# Patient Record
Sex: MALE | Race: WHITE | NOT HISPANIC OR LATINO | Employment: OTHER | URBAN - METROPOLITAN AREA
[De-identification: names, ages, dates, MRNs, and addresses within clinical notes are randomized per-mention and may not be internally consistent; named-entity substitution may affect disease eponyms.]

---

## 2017-04-11 ENCOUNTER — HOSPITAL ENCOUNTER (OUTPATIENT)
Dept: FAMILY MEDICINE CLINIC | Facility: CLINIC | Age: 47
Setting detail: SPECIMEN
Discharge: HOME | End: 2017-04-11
Attending: FAMILY MEDICINE | Admitting: FAMILY MEDICINE

## 2017-04-11 LAB
ALBUMIN SERPL-MCNC: 3.6 G/DL (ref 3.5–4.8)
ALBUMIN/GLOB SERPL: 1.5 {RATIO} (ref 1–1.7)
ALP SERPL-CCNC: 65 IU/L (ref 32–91)
ALT SERPL-CCNC: 30 IU/L (ref 17–63)
ANION GAP SERPL CALC-SCNC: 11.4 MMOL/L (ref 10–20)
AST SERPL-CCNC: 23 IU/L (ref 15–41)
BASOPHILS # BLD AUTO: 0 10*3/UL (ref 0–0.2)
BASOPHILS NFR BLD AUTO: 1 % (ref 0–2)
BILIRUB SERPL-MCNC: 0.7 MG/DL (ref 0.3–1.2)
BUN SERPL-MCNC: 17 MG/DL (ref 8–20)
BUN/CREAT SERPL: 14.2 (ref 6.2–20.3)
CALCIUM SERPL-MCNC: 9.4 MG/DL (ref 8.9–10.3)
CHLORIDE SERPL-SCNC: 106 MMOL/L (ref 101–111)
CHOLEST SERPL-MCNC: 161 MG/DL
CHOLEST/HDLC SERPL: 4.5 {RATIO}
CONV CO2: 28 MMOL/L (ref 22–32)
CONV LDL CHOLESTEROL DIRECT: 112 MG/DL (ref 0–100)
CONV TOTAL PROTEIN: 6 G/DL (ref 6.1–7.9)
CREAT UR-MCNC: 1.2 MG/DL (ref 0.7–1.2)
DIFFERENTIAL METHOD BLD: (no result)
EOSINOPHIL # BLD AUTO: 0.1 10*3/UL (ref 0–0.3)
EOSINOPHIL # BLD AUTO: 2 % (ref 0–3)
ERYTHROCYTE [DISTWIDTH] IN BLOOD BY AUTOMATED COUNT: 13.1 % (ref 11.5–14.5)
GLOBULIN UR ELPH-MCNC: 2.4 G/DL (ref 2.5–3.8)
GLUCOSE SERPL-MCNC: 96 MG/DL (ref 65–99)
HCT VFR BLD AUTO: 42.9 % (ref 40–54)
HDLC SERPL-MCNC: 36 MG/DL
HGB BLD-MCNC: 15 G/DL (ref 14–18)
LDLC/HDLC SERPL: 3.1 {RATIO}
LIPID INTERPRETATION: ABNORMAL
LYMPHOCYTES # BLD AUTO: 1.6 10*3/UL (ref 0.8–4.8)
LYMPHOCYTES NFR BLD AUTO: 29 % (ref 18–42)
MCH RBC QN AUTO: 31 PG (ref 26–32)
MCHC RBC AUTO-ENTMCNC: 35 G/DL (ref 32–36)
MCV RBC AUTO: 88.7 FL (ref 80–94)
MONOCYTES # BLD AUTO: 0.6 10*3/UL (ref 0.1–1.3)
MONOCYTES NFR BLD AUTO: 10 % (ref 2–11)
NEUTROPHILS # BLD AUTO: 3.2 10*3/UL (ref 2.3–8.6)
NEUTROPHILS NFR BLD AUTO: 58 % (ref 50–75)
NRBC BLD AUTO-RTO: 0 /100{WBCS}
NRBC/RBC NFR BLD MANUAL: 0 10*3/UL
PLATELET # BLD AUTO: 227 10*3/UL (ref 150–450)
PMV BLD AUTO: 7.8 FL (ref 7.4–10.4)
POTASSIUM SERPL-SCNC: 4.4 MMOL/L (ref 3.6–5.1)
PSA SERPL-MCNC: 1.07 NG/ML (ref 0–4)
RBC # BLD AUTO: 4.83 10*6/UL (ref 4.6–6)
SODIUM SERPL-SCNC: 141 MMOL/L (ref 136–144)
TRIGL SERPL-MCNC: 90 MG/DL
TSH SERPL-ACNC: 1.63 UIU/ML (ref 0.34–5.6)
VLDLC SERPL CALC-MCNC: 13.7 MG/DL
WBC # BLD AUTO: 5.5 10*3/UL (ref 4.5–11.5)

## 2017-06-22 ENCOUNTER — HOSPITAL ENCOUNTER (OUTPATIENT)
Dept: URGENT CARE | Facility: CLINIC | Age: 47
Discharge: HOME | End: 2017-06-22
Attending: FAMILY MEDICINE | Admitting: FAMILY MEDICINE

## 2018-07-23 ENCOUNTER — HOSPITAL ENCOUNTER (OUTPATIENT)
Dept: FAMILY MEDICINE CLINIC | Facility: CLINIC | Age: 48
Setting detail: SPECIMEN
Discharge: HOME | End: 2018-07-23
Attending: FAMILY MEDICINE | Admitting: FAMILY MEDICINE

## 2018-07-23 LAB
ALBUMIN SERPL-MCNC: 4.1 G/DL (ref 3.5–4.8)
ALBUMIN/GLOB SERPL: 1.7 {RATIO} (ref 1–1.7)
ALP SERPL-CCNC: 79 IU/L (ref 32–91)
ALT SERPL-CCNC: 38 IU/L (ref 17–63)
ANION GAP SERPL CALC-SCNC: 10.8 MMOL/L (ref 10–20)
AST SERPL-CCNC: 29 IU/L (ref 15–41)
BASOPHILS # BLD AUTO: 0 10*3/UL (ref 0–0.2)
BASOPHILS NFR BLD AUTO: 0 % (ref 0–2)
BILIRUB SERPL-MCNC: 0.8 MG/DL (ref 0.3–1.2)
BUN SERPL-MCNC: 12 MG/DL (ref 8–20)
BUN/CREAT SERPL: 9.2 (ref 6.2–20.3)
CALCIUM SERPL-MCNC: 9.4 MG/DL (ref 8.9–10.3)
CHLORIDE SERPL-SCNC: 102 MMOL/L (ref 101–111)
CONV CO2: 29 MMOL/L (ref 22–32)
CONV TOTAL PROTEIN: 6.5 G/DL (ref 6.1–7.9)
CREAT UR-MCNC: 1.3 MG/DL (ref 0.7–1.2)
DIFFERENTIAL METHOD BLD: (no result)
EOSINOPHIL # BLD AUTO: 0.1 10*3/UL (ref 0–0.3)
EOSINOPHIL # BLD AUTO: 1 % (ref 0–3)
ERYTHROCYTE [DISTWIDTH] IN BLOOD BY AUTOMATED COUNT: 13.5 % (ref 11.5–14.5)
GLOBULIN UR ELPH-MCNC: 2.4 G/DL (ref 2.5–3.8)
GLUCOSE SERPL-MCNC: 79 MG/DL (ref 65–99)
HCT VFR BLD AUTO: 45.4 % (ref 40–54)
HGB BLD-MCNC: 15.9 G/DL (ref 14–18)
LYMPHOCYTES # BLD AUTO: 1.5 10*3/UL (ref 0.8–4.8)
LYMPHOCYTES NFR BLD AUTO: 17 % (ref 18–42)
MCH RBC QN AUTO: 31 PG (ref 26–32)
MCHC RBC AUTO-ENTMCNC: 35.1 G/DL (ref 32–36)
MCV RBC AUTO: 88.2 FL (ref 80–94)
MONOCYTES # BLD AUTO: 0.5 10*3/UL (ref 0.1–1.3)
MONOCYTES NFR BLD AUTO: 6 % (ref 2–11)
NEUTROPHILS # BLD AUTO: 6.6 10*3/UL (ref 2.3–8.6)
NEUTROPHILS NFR BLD AUTO: 76 % (ref 50–75)
NRBC BLD AUTO-RTO: 0 /100{WBCS}
NRBC/RBC NFR BLD MANUAL: 0 10*3/UL
PLATELET # BLD AUTO: 279 10*3/UL (ref 150–450)
PMV BLD AUTO: 7.6 FL (ref 7.4–10.4)
POTASSIUM SERPL-SCNC: 3.8 MMOL/L (ref 3.6–5.1)
RBC # BLD AUTO: 5.14 10*6/UL (ref 4.6–6)
SODIUM SERPL-SCNC: 138 MMOL/L (ref 136–144)
WBC # BLD AUTO: 8.8 10*3/UL (ref 4.5–11.5)

## 2018-07-24 LAB
CONV HIV-1/ HIV-2: NORMAL
CONV HIV-1/ HIV-2: NORMAL
HBV CORE IGM SERPL QL IA: NONREACTIVE
HBV SURFACE AG SERPL QL IA: NONREACTIVE
HCV AB SER DONR QL: NORMAL
HCV AB SER DONR QL: NORMAL

## 2018-10-17 ENCOUNTER — HOSPITAL ENCOUNTER (OUTPATIENT)
Dept: FAMILY MEDICINE CLINIC | Facility: CLINIC | Age: 48
Setting detail: SPECIMEN
Discharge: HOME | End: 2018-10-17
Attending: FAMILY MEDICINE | Admitting: FAMILY MEDICINE

## 2018-10-17 LAB — PSA SERPL-MCNC: 1.67 NG/ML (ref 0–4)

## 2019-07-24 RX ORDER — ATORVASTATIN CALCIUM 20 MG/1
TABLET, FILM COATED ORAL
Qty: 90 TABLET | Refills: 1 | Status: SHIPPED | OUTPATIENT
Start: 2019-07-24 | End: 2020-01-25

## 2019-08-13 RX ORDER — BACLOFEN 10 MG/1
TABLET ORAL
Qty: 60 TABLET | Refills: 1 | Status: SHIPPED | OUTPATIENT
Start: 2019-08-13 | End: 2020-04-01

## 2020-01-25 RX ORDER — ATORVASTATIN CALCIUM 20 MG/1
TABLET, FILM COATED ORAL
Qty: 90 TABLET | Refills: 1 | Status: SHIPPED | OUTPATIENT
Start: 2020-01-25 | End: 2020-06-19

## 2020-02-06 ENCOUNTER — RESULTS ENCOUNTER (OUTPATIENT)
Dept: FAMILY MEDICINE CLINIC | Facility: CLINIC | Age: 50
End: 2020-02-06

## 2020-02-06 ENCOUNTER — OFFICE VISIT (OUTPATIENT)
Dept: FAMILY MEDICINE CLINIC | Facility: CLINIC | Age: 50
End: 2020-02-06

## 2020-02-06 VITALS
TEMPERATURE: 98.3 F | HEART RATE: 65 BPM | SYSTOLIC BLOOD PRESSURE: 123 MMHG | OXYGEN SATURATION: 98 % | WEIGHT: 230 LBS | DIASTOLIC BLOOD PRESSURE: 85 MMHG | BODY MASS INDEX: 33 KG/M2

## 2020-02-06 DIAGNOSIS — M25.552 ACUTE HIP PAIN, LEFT: ICD-10-CM

## 2020-02-06 DIAGNOSIS — Z29.9 PREVENTIVE MEASURE: ICD-10-CM

## 2020-02-06 DIAGNOSIS — Z12.5 ENCOUNTER FOR SCREENING FOR MALIGNANT NEOPLASM OF PROSTATE: ICD-10-CM

## 2020-02-06 DIAGNOSIS — E78.00 HIGH CHOLESTEROL: Primary | ICD-10-CM

## 2020-02-06 DIAGNOSIS — M79.642 LEFT HAND PAIN: ICD-10-CM

## 2020-02-06 LAB
ALBUMIN SERPL-MCNC: 4.4 G/DL (ref 3.5–5.2)
ALBUMIN/GLOB SERPL: 1.8 G/DL
ALP SERPL-CCNC: 86 U/L (ref 39–117)
ALT SERPL W P-5'-P-CCNC: 31 U/L (ref 1–41)
ANION GAP SERPL CALCULATED.3IONS-SCNC: 11.5 MMOL/L (ref 5–15)
AST SERPL-CCNC: 23 U/L (ref 1–40)
BASOPHILS # BLD AUTO: 0.05 10*3/MM3 (ref 0–0.2)
BASOPHILS NFR BLD AUTO: 0.9 % (ref 0–1.5)
BILIRUB SERPL-MCNC: 0.6 MG/DL (ref 0.2–1.2)
BUN BLD-MCNC: 16 MG/DL (ref 6–20)
BUN/CREAT SERPL: 14.3 (ref 7–25)
CALCIUM SPEC-SCNC: 9.7 MG/DL (ref 8.6–10.5)
CHLORIDE SERPL-SCNC: 101 MMOL/L (ref 98–107)
CHOLEST SERPL-MCNC: 186 MG/DL (ref 0–200)
CO2 SERPL-SCNC: 28.5 MMOL/L (ref 22–29)
CREAT BLD-MCNC: 1.12 MG/DL (ref 0.76–1.27)
DEPRECATED RDW RBC AUTO: 39.4 FL (ref 37–54)
EOSINOPHIL # BLD AUTO: 0.13 10*3/MM3 (ref 0–0.4)
EOSINOPHIL NFR BLD AUTO: 2.4 % (ref 0.3–6.2)
ERYTHROCYTE [DISTWIDTH] IN BLOOD BY AUTOMATED COUNT: 12.4 % (ref 12.3–15.4)
GFR SERPL CREATININE-BSD FRML MDRD: 69 ML/MIN/1.73
GLOBULIN UR ELPH-MCNC: 2.4 GM/DL
GLUCOSE BLD-MCNC: 109 MG/DL (ref 65–99)
HCT VFR BLD AUTO: 47.8 % (ref 37.5–51)
HDLC SERPL-MCNC: 42 MG/DL (ref 40–60)
HGB BLD-MCNC: 16.6 G/DL (ref 13–17.7)
IMM GRANULOCYTES # BLD AUTO: 0.01 10*3/MM3 (ref 0–0.05)
IMM GRANULOCYTES NFR BLD AUTO: 0.2 % (ref 0–0.5)
LDLC SERPL CALC-MCNC: 128 MG/DL (ref 0–100)
LDLC/HDLC SERPL: 3.04 {RATIO}
LYMPHOCYTES # BLD AUTO: 1.21 10*3/MM3 (ref 0.7–3.1)
LYMPHOCYTES NFR BLD AUTO: 22.5 % (ref 19.6–45.3)
MCH RBC QN AUTO: 30.3 PG (ref 26.6–33)
MCHC RBC AUTO-ENTMCNC: 34.7 G/DL (ref 31.5–35.7)
MCV RBC AUTO: 87.2 FL (ref 79–97)
MONOCYTES # BLD AUTO: 0.52 10*3/MM3 (ref 0.1–0.9)
MONOCYTES NFR BLD AUTO: 9.7 % (ref 5–12)
NEUTROPHILS # BLD AUTO: 3.46 10*3/MM3 (ref 1.7–7)
NEUTROPHILS NFR BLD AUTO: 64.3 % (ref 42.7–76)
NRBC BLD AUTO-RTO: 0 /100 WBC (ref 0–0.2)
PLATELET # BLD AUTO: 277 10*3/MM3 (ref 140–450)
PMV BLD AUTO: 9.7 FL (ref 6–12)
POTASSIUM BLD-SCNC: 4.8 MMOL/L (ref 3.5–5.2)
PROT SERPL-MCNC: 6.8 G/DL (ref 6–8.5)
PSA SERPL-MCNC: 1.56 NG/ML (ref 0–4)
RBC # BLD AUTO: 5.48 10*6/MM3 (ref 4.14–5.8)
SODIUM BLD-SCNC: 141 MMOL/L (ref 136–145)
TRIGL SERPL-MCNC: 81 MG/DL (ref 0–150)
URATE SERPL-MCNC: 6.1 MG/DL (ref 3.4–7)
VLDLC SERPL-MCNC: 16.2 MG/DL (ref 5–40)
WBC NRBC COR # BLD: 5.38 10*3/MM3 (ref 3.4–10.8)

## 2020-02-06 PROCEDURE — 99214 OFFICE O/P EST MOD 30 MIN: CPT | Performed by: FAMILY MEDICINE

## 2020-02-06 PROCEDURE — 80061 LIPID PANEL: CPT | Performed by: FAMILY MEDICINE

## 2020-02-06 PROCEDURE — 85025 COMPLETE CBC W/AUTO DIFF WBC: CPT | Performed by: FAMILY MEDICINE

## 2020-02-06 PROCEDURE — 80053 COMPREHEN METABOLIC PANEL: CPT | Performed by: FAMILY MEDICINE

## 2020-02-06 PROCEDURE — 84550 ASSAY OF BLOOD/URIC ACID: CPT | Performed by: FAMILY MEDICINE

## 2020-02-06 PROCEDURE — G0103 PSA SCREENING: HCPCS | Performed by: FAMILY MEDICINE

## 2020-02-06 PROCEDURE — 36415 COLL VENOUS BLD VENIPUNCTURE: CPT | Performed by: FAMILY MEDICINE

## 2020-02-06 NOTE — PROGRESS NOTES
Subjective   Ari Ngo is a 49 y.o. male.     He is in for follow up on his high cholesterol. He has noted some pain in his hips, primarily with running. He also has some stiffness and swelling in the 3rd finger of the L hand with no history of trauma or injury.  He denies any numbness or tingling in the hand or leg.  He has been able to do his ADLs, however has been much more uncomfortable, particularly the hip issue with putting on pants and socks.  He denies any issues with bowel or bladder function.  He denies any chest pain or headaches.  He denies any issues with sleep or mood.         /85 (BP Location: Right arm, Patient Position: Sitting, Cuff Size: Adult)   Pulse 65   Temp 98.3 °F (36.8 °C) (Oral)   Wt 104 kg (230 lb)   SpO2 98%   BMI 33.00 kg/m²       Chief Complaint   Patient presents with   • Med Refill   • Hand Pain     swelling in his middle finger on his left hand   • Hip Pain     mostly his left but sometimes the right one also           Current Outpatient Medications:   •  atorvastatin (LIPITOR) 20 MG tablet, TAKE 1 TABLET BY MOUTH AT BEDTIME, Disp: 90 tablet, Rfl: 1  •  baclofen (LIORESAL) 10 MG tablet, TAKE 1-2 TABLETS BY MOUTH 3 TIMES A DAY AS NEEDED FOR BACK PAIN/SPASMS, Disp: 60 tablet, Rfl: 1        The following portions of the patient's history were reviewed and updated as appropriate: allergies, current medications, past family history, past medical history, past social history, past surgical history and problem list.    Review of Systems   Constitutional: Negative for activity change, fatigue and fever.   HENT: Negative for congestion, sinus pressure, sinus pain, sore throat and trouble swallowing.    Eyes: Negative for visual disturbance.   Respiratory: Negative for chest tightness, shortness of breath and wheezing.    Cardiovascular: Negative for chest pain.   Gastrointestinal: Negative for abdominal distention, abdominal pain, constipation, diarrhea, nausea and vomiting.    Genitourinary: Negative for difficulty urinating, dysuria, frequency and urgency.   Musculoskeletal: Positive for arthralgias. Negative for back pain, gait problem and neck pain.   Neurological: Negative for dizziness, weakness, light-headedness, numbness and headaches.   Psychiatric/Behavioral: Negative for agitation, hallucinations and suicidal ideas.       Objective   Physical Exam   Constitutional: He is oriented to person, place, and time. No distress.   Neck: Normal range of motion. No thyromegaly present.   Cardiovascular: Normal rate, regular rhythm and normal heart sounds.   Pulmonary/Chest: Effort normal and breath sounds normal.   Abdominal: Soft. Bowel sounds are normal.   Musculoskeletal:   Limited range of motion in the left hip, pain with internal rotation  Swollen PIP of the third finger left hand but he can make a fist without pain and has normal strength in the hand   Lymphadenopathy:     He has no cervical adenopathy.   Neurological: He is alert and oriented to person, place, and time. He displays normal reflexes.   Skin: No rash noted.   Nursing note and vitals reviewed.        Assessment/Plan   Problems Addressed this Visit     None      Visit Diagnoses     High cholesterol    -  Primary    Relevant Orders    Comprehensive metabolic panel    Lipid panel    Preventive measure        Relevant Orders    Cologuard - Stool, Per Rectum    Left hand pain        Relevant Orders    XR Hand 3+ View Left    Uric acid    Acute hip pain, left        Relevant Orders    XR Hip With or Without Pelvis 4 View Left    CBC w AUTO Differential (Completed)    CBC Auto Differential (Completed)    Encounter for screening for malignant neoplasm of prostate        Relevant Orders    PSA SCREENING          I will update appropriate labs  I will refer out an order to [facility] as he is now 49 and needs colon cancer screening  I will send for x-rays of the hand and hip given current complaints  I will see him back or  refer as indicated by findings     '

## 2020-02-16 DIAGNOSIS — M25.552 ACUTE HIP PAIN, LEFT: Primary | ICD-10-CM

## 2020-04-01 RX ORDER — BACLOFEN 10 MG/1
TABLET ORAL
Qty: 60 TABLET | Refills: 1 | Status: SHIPPED | OUTPATIENT
Start: 2020-04-01 | End: 2020-11-24 | Stop reason: SDUPTHER

## 2020-04-06 ENCOUNTER — TELEPHONE (OUTPATIENT)
Dept: FAMILY MEDICINE CLINIC | Facility: CLINIC | Age: 50
End: 2020-04-06

## 2020-04-06 NOTE — TELEPHONE ENCOUNTER
Unfortunately right now there is not  They are working on a test to see if antibodies are present, similar to what we do to see if someone needs a booster for MMR or shingle vaccines  I think will have that and soon but we do not have it now

## 2020-05-26 ENCOUNTER — OFFICE VISIT (OUTPATIENT)
Dept: FAMILY MEDICINE CLINIC | Facility: CLINIC | Age: 50
End: 2020-05-26

## 2020-05-26 VITALS
DIASTOLIC BLOOD PRESSURE: 83 MMHG | HEART RATE: 67 BPM | BODY MASS INDEX: 34.01 KG/M2 | TEMPERATURE: 98.7 F | WEIGHT: 237 LBS | SYSTOLIC BLOOD PRESSURE: 120 MMHG | OXYGEN SATURATION: 97 %

## 2020-05-26 DIAGNOSIS — M15.9 PRIMARY OSTEOARTHRITIS INVOLVING MULTIPLE JOINTS: Primary | ICD-10-CM

## 2020-05-26 PROCEDURE — 99213 OFFICE O/P EST LOW 20 MIN: CPT | Performed by: FAMILY MEDICINE

## 2020-05-26 RX ORDER — NABUMETONE 750 MG/1
750 TABLET, FILM COATED ORAL 2 TIMES DAILY
Qty: 60 TABLET | Refills: 2 | Status: SHIPPED | OUTPATIENT
Start: 2020-05-26 | End: 2020-12-22

## 2020-05-26 NOTE — PROGRESS NOTES
Subjective   Ari Ngo is a 49 y.o. male.     He is in today with some lingering discomfort in his hands and his feet.  He has had one episode of pain in his mid back when he lifted an item that caused some spasm and nearly dropped him to his knees.  He has not had any further episode of problem with his back but his knees and and feet and hands continue to be daily sources of discomfort.  He has not had any swelling of note.  He does not take any medication for this.  He has tried some of his baclofen for this without any help.  Sleep is okay.  He has been working on better diet and has reduced his soda intake but has gained some weight because he has not been as active of late.  Exercise has been uncomfortable.         /83 (BP Location: Right arm, Patient Position: Sitting, Cuff Size: Adult)   Pulse 67   Temp 98.7 °F (37.1 °C) (Temporal)   Wt 108 kg (237 lb)   SpO2 97%   BMI 34.01 kg/m²       Chief Complaint   Patient presents with   • Hand Pain     had an expisode picked up trays and went to turn and had a pain in his mid back that drove him to his knees   • Generalized Body Aches     pain in feet (sometimes) wrist pain           Current Outpatient Medications:   •  atorvastatin (LIPITOR) 20 MG tablet, TAKE 1 TABLET BY MOUTH AT BEDTIME, Disp: 90 tablet, Rfl: 1  •  baclofen (LIORESAL) 10 MG tablet, TAKE 1-2 TABLETS BY MOUTH 3 TIMES A DAY AS NEEDED FOR BACK PAIN/SPASMS, Disp: 60 tablet, Rfl: 1  •  nabumetone (RELAFEN) 750 MG tablet, Take 1 tablet by mouth 2 (Two) Times a Day., Disp: 60 tablet, Rfl: 2        The following portions of the patient's history were reviewed and updated as appropriate: allergies, current medications, past family history, past medical history, past social history, past surgical history and problem list.    Review of Systems   Constitutional: Negative for activity change, fatigue and fever.   HENT: Negative for congestion, sinus pressure, sinus pain, sore throat and trouble  swallowing.    Eyes: Negative for visual disturbance.   Respiratory: Negative for chest tightness, shortness of breath and wheezing.    Cardiovascular: Negative for chest pain.   Gastrointestinal: Negative for abdominal distention, abdominal pain, constipation, diarrhea, nausea and vomiting.   Genitourinary: Negative for difficulty urinating and dysuria.   Musculoskeletal: Positive for arthralgias. Negative for back pain and neck pain.   Psychiatric/Behavioral: Negative for agitation, hallucinations and suicidal ideas.       Objective   Physical Exam   Constitutional: He is oriented to person, place, and time.   HENT:   Mouth/Throat: No oropharyngeal exudate.   Neck: Normal range of motion. No thyromegaly present.   Cardiovascular: Normal rate, regular rhythm and normal heart sounds.   Pulmonary/Chest: Effort normal and breath sounds normal. He has no wheezes.   Abdominal: Soft. Bowel sounds are normal. There is no guarding.   Musculoskeletal: He exhibits no edema.   Diffusely arthritic in the hands and feet and knees  Back also with arthritic issues in the mid back   Lymphadenopathy:     He has no cervical adenopathy.   Neurological: He is alert and oriented to person, place, and time. He displays normal reflexes.   Psychiatric: He has a normal mood and affect.   Nursing note and vitals reviewed.        Assessment/Plan   Problems Addressed this Visit     None      Visit Diagnoses     Primary osteoarthritis involving multiple joints    -  Primary        I will have him use nabumetone on a daily basis for the next couple of weeks and update me on response  He is to call with any new concerns in the interim  We will reconsider physical therapy which I referred him for but he decided not to do because of coronavirus, should we feel it would be helpful.

## 2020-06-19 RX ORDER — ATORVASTATIN CALCIUM 20 MG/1
TABLET, FILM COATED ORAL
Qty: 90 TABLET | Refills: 1 | Status: SHIPPED | OUTPATIENT
Start: 2020-06-19 | End: 2020-12-23

## 2020-11-24 RX ORDER — BACLOFEN 10 MG/1
10 TABLET ORAL 3 TIMES DAILY
Qty: 60 TABLET | Refills: 1 | Status: SHIPPED | OUTPATIENT
Start: 2020-11-24 | End: 2021-08-03

## 2020-12-22 RX ORDER — NABUMETONE 750 MG/1
TABLET, FILM COATED ORAL
Qty: 60 TABLET | Refills: 2 | Status: SHIPPED | OUTPATIENT
Start: 2020-12-22 | End: 2021-07-23

## 2020-12-23 RX ORDER — ATORVASTATIN CALCIUM 20 MG/1
TABLET, FILM COATED ORAL
Qty: 90 TABLET | Refills: 1 | Status: SHIPPED | OUTPATIENT
Start: 2020-12-23 | End: 2021-07-31

## 2021-04-22 ENCOUNTER — LAB (OUTPATIENT)
Dept: FAMILY MEDICINE CLINIC | Facility: CLINIC | Age: 51
End: 2021-04-22

## 2021-04-22 ENCOUNTER — OFFICE VISIT (OUTPATIENT)
Dept: FAMILY MEDICINE CLINIC | Facility: CLINIC | Age: 51
End: 2021-04-22

## 2021-04-22 VITALS
WEIGHT: 237 LBS | DIASTOLIC BLOOD PRESSURE: 81 MMHG | HEART RATE: 60 BPM | SYSTOLIC BLOOD PRESSURE: 121 MMHG | BODY MASS INDEX: 34.01 KG/M2 | OXYGEN SATURATION: 98 % | TEMPERATURE: 97.3 F

## 2021-04-22 DIAGNOSIS — Z29.9 PREVENTIVE MEASURE: ICD-10-CM

## 2021-04-22 DIAGNOSIS — E78.00 HIGH CHOLESTEROL: ICD-10-CM

## 2021-04-22 DIAGNOSIS — M25.562 ACUTE PAIN OF LEFT KNEE: Primary | ICD-10-CM

## 2021-04-22 DIAGNOSIS — Z29.9 PREVENTIVE MEASURE: Primary | ICD-10-CM

## 2021-04-22 DIAGNOSIS — Z12.5 ENCOUNTER FOR SCREENING FOR MALIGNANT NEOPLASM OF PROSTATE: ICD-10-CM

## 2021-04-22 LAB
ALBUMIN SERPL-MCNC: 4.2 G/DL (ref 3.5–5.2)
ALBUMIN/GLOB SERPL: 1.6 G/DL
ALP SERPL-CCNC: 85 U/L (ref 39–117)
ALT SERPL W P-5'-P-CCNC: 35 U/L (ref 1–41)
ANION GAP SERPL CALCULATED.3IONS-SCNC: 7.5 MMOL/L (ref 5–15)
AST SERPL-CCNC: 28 U/L (ref 1–40)
BILIRUB SERPL-MCNC: 0.8 MG/DL (ref 0–1.2)
BUN SERPL-MCNC: 19 MG/DL (ref 6–20)
BUN/CREAT SERPL: 13.6 (ref 7–25)
CALCIUM SPEC-SCNC: 9.5 MG/DL (ref 8.6–10.5)
CHLORIDE SERPL-SCNC: 102 MMOL/L (ref 98–107)
CHOLEST SERPL-MCNC: 183 MG/DL (ref 0–200)
CO2 SERPL-SCNC: 28.5 MMOL/L (ref 22–29)
CREAT SERPL-MCNC: 1.4 MG/DL (ref 0.76–1.27)
GFR SERPL CREATININE-BSD FRML MDRD: 53 ML/MIN/1.73
GLOBULIN UR ELPH-MCNC: 2.6 GM/DL
GLUCOSE SERPL-MCNC: 87 MG/DL (ref 65–99)
HDLC SERPL-MCNC: 40 MG/DL (ref 40–60)
LDLC SERPL CALC-MCNC: 129 MG/DL (ref 0–100)
LDLC/HDLC SERPL: 3.21 {RATIO}
POTASSIUM SERPL-SCNC: 4.7 MMOL/L (ref 3.5–5.2)
PROT SERPL-MCNC: 6.8 G/DL (ref 6–8.5)
PSA SERPL-MCNC: 1.55 NG/ML (ref 0–4)
SODIUM SERPL-SCNC: 138 MMOL/L (ref 136–145)
TRIGL SERPL-MCNC: 73 MG/DL (ref 0–150)
VLDLC SERPL-MCNC: 14 MG/DL (ref 5–40)

## 2021-04-22 PROCEDURE — G0103 PSA SCREENING: HCPCS | Performed by: FAMILY MEDICINE

## 2021-04-22 PROCEDURE — 99214 OFFICE O/P EST MOD 30 MIN: CPT | Performed by: FAMILY MEDICINE

## 2021-04-22 PROCEDURE — 84402 ASSAY OF FREE TESTOSTERONE: CPT | Performed by: FAMILY MEDICINE

## 2021-04-22 PROCEDURE — 36415 COLL VENOUS BLD VENIPUNCTURE: CPT | Performed by: FAMILY MEDICINE

## 2021-04-22 PROCEDURE — 80061 LIPID PANEL: CPT | Performed by: FAMILY MEDICINE

## 2021-04-22 PROCEDURE — 80053 COMPREHEN METABOLIC PANEL: CPT | Performed by: FAMILY MEDICINE

## 2021-04-22 PROCEDURE — 84403 ASSAY OF TOTAL TESTOSTERONE: CPT | Performed by: FAMILY MEDICINE

## 2021-04-22 RX ORDER — PREDNISONE 10 MG/1
TABLET ORAL
Qty: 40 TABLET | Refills: 0 | Status: SHIPPED | OUTPATIENT
Start: 2021-04-22 | End: 2021-05-24 | Stop reason: SDUPTHER

## 2021-04-22 NOTE — PROGRESS NOTES
Subjective   Ari Ngo is a 50 y.o. male.     Ari Ngo is in for a couple reasons.  He has high cholesterol and is due for labs.  He is overdue to have colon screening.  His main concern, however, is some recent left knee pain.  He noticed it while running a couple weeks ago.  He thinks he took a bad step or may have even stepped in some type of pothole.  He had some swelling for about 5 days but that eased on its own.  He takes chronic anti-inflammatories for some arthritis in his hips and that has helped some, but has not been able to give him full resolution.  He can walk without discomfort but if he runs or takes stairs he has discomfort. There is no history of chest pain or dyspnea. There is no history of issue with bowel or bladder dysfunction. There is no history of dizziness or lightheadedness. There is no history of issue with sleep or mood, but sleep is a little bothered when his knee is painful. There is no history of issue with present medication.            /81 (BP Location: Left arm, Patient Position: Sitting, Cuff Size: Large Adult)   Pulse 60   Temp 97.3 °F (36.3 °C) (Temporal)   Wt 108 kg (237 lb)   SpO2 98%   BMI 34.01 kg/m²       Chief Complaint   Patient presents with   • Knee Pain     left - running and it popped and hurting since then - also has chronic hip pain - also wants to get labs fasting            Current Outpatient Medications:   •  atorvastatin (LIPITOR) 20 MG tablet, TAKE 1 TABLET BY MOUTH EVERYDAY AT BEDTIME, Disp: 90 tablet, Rfl: 1  •  baclofen (LIORESAL) 10 MG tablet, Take 1 tablet by mouth 3 (Three) Times a Day., Disp: 60 tablet, Rfl: 1  •  nabumetone (RELAFEN) 750 MG tablet, TAKE 1 TABLET BY MOUTH TWICE A DAY, Disp: 60 tablet, Rfl: 2        The following portions of the patient's history were reviewed and updated as appropriate: allergies, current medications, past family history, past medical history, past social history, past surgical history, and problem  list.    Review of Systems   Constitutional: Negative for activity change, fatigue and fever.   HENT: Negative for congestion, sinus pressure, sinus pain, sore throat and trouble swallowing.    Eyes: Negative for visual disturbance.   Respiratory: Negative for chest tightness, shortness of breath and wheezing.    Cardiovascular: Negative for chest pain.   Gastrointestinal: Negative for abdominal distention, abdominal pain, constipation, diarrhea, nausea and vomiting.   Genitourinary: Negative for difficulty urinating and dysuria.   Musculoskeletal: Positive for arthralgias (left knee and hip) and joint swelling (Left knee but has resolved). Negative for back pain, gait problem and neck pain.   Psychiatric/Behavioral: Positive for sleep disturbance (only when the knee is bothering him). Negative for agitation, hallucinations and suicidal ideas.       Objective   Physical Exam  Vitals and nursing note reviewed.   Constitutional:       Appearance: Normal appearance.   Eyes:      Conjunctiva/sclera: Conjunctivae normal.      Pupils: Pupils are equal, round, and reactive to light.   Cardiovascular:      Rate and Rhythm: Normal rate and regular rhythm.      Heart sounds: Normal heart sounds. No murmur heard.     Pulmonary:      Effort: Pulmonary effort is normal.      Breath sounds: No wheezing or rales.   Abdominal:      General: Bowel sounds are normal.      Palpations: Abdomen is soft.      Tenderness: There is no abdominal tenderness. There is no guarding.   Musculoskeletal:      Cervical back: Neck supple.      Right lower leg: No edema.      Left lower leg: No edema.      Comments: Discomfort in the medial portion of the left knee but no palpable deformity noted  Straight-line gait is normal  Lateral mobility is intact  Pain with rotational exam of the left knee   Lymphadenopathy:      Cervical: No cervical adenopathy.   Skin:     Findings: No rash.   Neurological:      General: No focal deficit present.      Mental  Status: He is alert and oriented to person, place, and time.   Psychiatric:         Mood and Affect: Mood normal.           Assessment/Plan   Problems Addressed this Visit     None      Visit Diagnoses     Acute pain of left knee    -  Primary    Relevant Orders    Ambulatory Referral to Physical Therapy Evaluate and treat (Completed)    High cholesterol        Relevant Orders    Comprehensive metabolic panel    Lipid panel    Testosterone (Free & Total), LC / MS    Encounter for screening for malignant neoplasm of prostate        Relevant Orders    PSA SCREENING    Preventive measure        Relevant Orders    Cologuard - Stool, Per Rectum      Diagnoses       Codes Comments    Acute pain of left knee    -  Primary ICD-10-CM: M25.562  ICD-9-CM: 719.46     High cholesterol     ICD-10-CM: E78.00  ICD-9-CM: 272.0     Encounter for screening for malignant neoplasm of prostate     ICD-10-CM: Z12.5  ICD-9-CM: V76.44     Preventive measure     ICD-10-CM: Z29.9  ICD-9-CM: V07.9           I will update labs and get his Cologuard ordered  I will refer him to physical therapy for the knee but if not responding I will send him to orthopedics  I will send him out a little bit of steroid to help with the knee  I will have him remain as active as the knee will allow  I will see him back in the office based on lab findings and response to treatment

## 2021-04-27 LAB
TESTOST FREE SERPL-MCNC: 7.7 PG/ML (ref 7.2–24)
TESTOST SERPL-MCNC: 829.1 NG/DL (ref 264–916)

## 2021-05-24 ENCOUNTER — TELEPHONE (OUTPATIENT)
Dept: FAMILY MEDICINE CLINIC | Facility: CLINIC | Age: 51
End: 2021-05-24

## 2021-05-24 RX ORDER — PREDNISONE 10 MG/1
TABLET ORAL
Qty: 40 TABLET | Refills: 0 | Status: SHIPPED | OUTPATIENT
Start: 2021-05-24 | End: 2021-07-23

## 2021-07-28 ENCOUNTER — OFFICE VISIT (OUTPATIENT)
Dept: FAMILY MEDICINE CLINIC | Facility: CLINIC | Age: 51
End: 2021-07-28

## 2021-07-28 VITALS
DIASTOLIC BLOOD PRESSURE: 84 MMHG | TEMPERATURE: 98.4 F | HEART RATE: 83 BPM | HEIGHT: 71 IN | SYSTOLIC BLOOD PRESSURE: 121 MMHG | OXYGEN SATURATION: 95 % | WEIGHT: 233 LBS | BODY MASS INDEX: 32.62 KG/M2

## 2021-07-28 DIAGNOSIS — R05.8 POST-VIRAL COUGH SYNDROME: Primary | ICD-10-CM

## 2021-07-28 PROCEDURE — 99213 OFFICE O/P EST LOW 20 MIN: CPT | Performed by: FAMILY MEDICINE

## 2021-07-28 NOTE — PATIENT INSTRUCTIONS
Cough, Adult  A cough helps to clear your throat and lungs. A cough may be a sign of an illness or another medical condition.  An acute cough may only last 2-3 weeks, while a chronic cough may last 8 or more weeks.  Many things can cause a cough. They include:  · Germs (viruses or bacteria) that attack the airway.  · Breathing in things that bother (irritate) your lungs.  · Allergies.  · Asthma.  · Mucus that runs down the back of your throat (postnasal drip).  · Smoking.  · Acid backing up from the stomach into the tube that moves food from the mouth to the stomach (gastroesophageal reflux).  · Some medicines.  · Lung problems.  · Other medical conditions, such as heart failure or a blood clot in the lung (pulmonary embolism).  Follow these instructions at home:  Medicines  · Take over-the-counter and prescription medicines only as told by your doctor.  · Talk with your doctor before you take medicines that stop a cough (cough suppressants).  Lifestyle    · Do not smoke, and try not to be around smoke. Do not use any products that contain nicotine or tobacco, such as cigarettes, e-cigarettes, and chewing tobacco. If you need help quitting, ask your doctor.  · Drink enough fluid to keep your pee (urine) pale yellow.  · Avoid caffeine.  · Do not drink alcohol if your doctor tells you not to drink.  General instructions    · Watch for any changes in your cough. Tell your doctor about them.  · Always cover your mouth when you cough.  · Stay away from things that make you cough, such as perfume, candles, campfire smoke, or cleaning products.  · If the air is dry, use a cool mist vaporizer or humidifier in your home.  · If your cough is worse at night, try using extra pillows to raise your head up higher while you sleep.  · Rest as needed.  · Keep all follow-up visits as told by your doctor. This is important.  Contact a doctor if:  · You have new symptoms.  · You cough up pus.  · Your cough does not get better after 2-3  weeks, or your cough gets worse.  · Cough medicine does not help your cough and you are not sleeping well.  · You have pain that gets worse or pain that is not helped with medicine.  · You have a fever.  · You are losing weight and you do not know why.  · You have night sweats.  Get help right away if:  · You cough up blood.  · You have trouble breathing.  · Your heartbeat is very fast.  These symptoms may be an emergency. Do not wait to see if the symptoms will go away. Get medical help right away. Call your local emergency services (911 in the [place] Do not drive yourself to the hospital.  Summary  · A cough helps to clear your throat and lungs. Many things can cause a cough.  · Take over-the-counter and prescription medicines only as told by your doctor.  · Always cover your mouth when you cough.  · Contact a doctor if you have new symptoms or you have a cough that does not get better or gets worse.  This information is not intended to replace advice given to you by your health care provider. Make sure you discuss any questions you have with your health care provider.  Document Revised: 02/05/2021 Document Reviewed: 01/06/2020  Elsevier Patient Education © 2021 [employer]

## 2021-07-28 NOTE — PROGRESS NOTES
Subjective:     Ari Ngo is a 51 y.o. male who presents for  Chief Complaint   Patient presents with   • Cough     had been sick on and off since early July. Was still having a cough. Symptoms are much better today other than having a strange taste in his mouth . still on cough medication that was given by urgent care.      Obese white male presents with complaints of persistent cough since early July 2021, 4th of July weekend. Cough quality unchanged in the last month. Associated symptoms include abnormal taste. Was seen at local urgent care last week and prescribed a course of Zithromax and and antitussive medication. Patient has since completed antibiotics. Denies any dyspnea. Denies any fever. Afebrile in office. Sick contacts include coworker at the beginning of the month. Of note patient has been vaccinated against SARS-CoV-2.    Past Medical Hx:  Past Medical History:   Diagnosis Date   • Abdominal muscle strain     abdominal wall   • Acute bronchitis    • Acute maxillary sinusitis    • Arthritis of right sacroiliac joint    • Chest pain, unspecified    • Chronic hip pain, right    • Diarrhea    • Dysuria    • Flu syndrome    • Hyperlipidemia    • Left shoulder pain    • Low back pain    • Neck sprain     and strain   • Rectal bleeding     rectal/anal bleeding   • Rhus dermatitis    • Rotator cuff injury     partial tear L bicep and L rotator   • Seasonal allergies    • Viral enteritis        Past Surgical Hx:  Past Surgical History:   Procedure Laterality Date   • SHOULDER SURGERY Right    • VASECTOMY         Social History:  he  reports that he has never smoked. He has never used smokeless tobacco. He reports current alcohol use. He reports previous drug use.    Current Meds:    Current Outpatient Medications:   •  atorvastatin (LIPITOR) 20 MG tablet, TAKE 1 TABLET BY MOUTH EVERYDAY AT BEDTIME, Disp: 90 tablet, Rfl: 1  •  azithromycin (ZITHROMAX) 250 MG tablet, Take 1 tablet by mouth Daily. Take 2  "tablets the first day, then 1 tablet daily for 4 days., Disp: 6 tablet, Rfl: 0  •  baclofen (LIORESAL) 10 MG tablet, Take 1 tablet by mouth 3 (Three) Times a Day., Disp: 60 tablet, Rfl: 1  •  benzonatate (TESSALON) 200 MG capsule, Take 1 capsule by mouth 3 (Three) Times a Day As Needed for Cough., Disp: 30 capsule, Rfl: 0  •  PSEUDOPHED-CHLOPHEDIANOL-GG PO, Take  by mouth., Disp: , Rfl:       Review of Systems  Review of Systems    Objective:     /84 (BP Location: Left arm, Patient Position: Sitting, Cuff Size: Large Adult)   Pulse 83   Temp 98.4 °F (36.9 °C) (Infrared)   Ht 180.3 cm (71\")   Wt 106 kg (233 lb)   SpO2 95%   BMI 32.50 kg/m²     Physical Exam  Vitals reviewed.   Constitutional:       General: He is not in acute distress.     Appearance: He is well-developed. He is not diaphoretic.   HENT:      Head: Normocephalic and atraumatic.      Right Ear: Tympanic membrane and ear canal normal.      Left Ear: Tympanic membrane and ear canal normal.      Nose:      Right Sinus: No maxillary sinus tenderness or frontal sinus tenderness.      Left Sinus: No maxillary sinus tenderness or frontal sinus tenderness.      Mouth/Throat:      Mouth: Mucous membranes are moist.      Pharynx: Oropharynx is clear.   Cardiovascular:      Rate and Rhythm: Normal rate and regular rhythm.      Heart sounds: Normal heart sounds.   Pulmonary:      Effort: Pulmonary effort is normal.      Breath sounds: Normal breath sounds. No wheezing.   Lymphadenopathy:      Cervical: No cervical adenopathy.   Skin:     General: Skin is warm and dry.   Neurological:      Mental Status: He is alert and oriented to person, place, and time.   Psychiatric:         Mood and Affect: Mood normal.         Behavior: Behavior normal.         Lab Results   Component Value Date    WBC 5.38 02/06/2020    HGB 16.6 02/06/2020    HCT 47.8 02/06/2020    MCV 87.2 02/06/2020     02/06/2020     Lab Results   Component Value Date    GLUCOSE 87 " 04/22/2021    BUN 19 04/22/2021    CREATININE 1.40 (H) 04/22/2021    EGFRIFNONA 53 (L) 04/22/2021    BCR 13.6 04/22/2021    K 4.7 04/22/2021    CO2 28.5 04/22/2021    CALCIUM 9.5 04/22/2021    ALBUMIN 4.20 04/22/2021    LABIL2 1.7 07/23/2018    AST 28 04/22/2021    ALT 35 04/22/2021        Assessment/Plan:     Problem List Items Addressed This Visit     None      Visit Diagnoses     Post-viral cough syndrome    -  Primary      Post viral cough vs cough variant asthma vs post nasal drip.  Discussed supportive care with Flonase and antitussives.   Consider CXR & PFT if symptoms persist.   Symptomology does not appear indicative of SARS-COV-2. Consider testing if symptoms persist.    Follow-up:     Return if symptoms worsen or fail to improve.

## 2021-07-31 RX ORDER — ATORVASTATIN CALCIUM 20 MG/1
TABLET, FILM COATED ORAL
Qty: 90 TABLET | Refills: 1 | Status: SHIPPED | OUTPATIENT
Start: 2021-07-31 | End: 2022-01-26

## 2021-08-03 ENCOUNTER — LAB (OUTPATIENT)
Dept: FAMILY MEDICINE CLINIC | Facility: CLINIC | Age: 51
End: 2021-08-03

## 2021-08-03 ENCOUNTER — OFFICE VISIT (OUTPATIENT)
Dept: FAMILY MEDICINE CLINIC | Facility: CLINIC | Age: 51
End: 2021-08-03

## 2021-08-03 VITALS
HEART RATE: 74 BPM | SYSTOLIC BLOOD PRESSURE: 129 MMHG | DIASTOLIC BLOOD PRESSURE: 92 MMHG | BODY MASS INDEX: 33.18 KG/M2 | HEIGHT: 71 IN | WEIGHT: 237 LBS | OXYGEN SATURATION: 98 %

## 2021-08-03 DIAGNOSIS — J06.9 UPPER RESPIRATORY TRACT INFECTION, UNSPECIFIED TYPE: Primary | ICD-10-CM

## 2021-08-03 PROCEDURE — U0003 INFECTIOUS AGENT DETECTION BY NUCLEIC ACID (DNA OR RNA); SEVERE ACUTE RESPIRATORY SYNDROME CORONAVIRUS 2 (SARS-COV-2) (CORONAVIRUS DISEASE [COVID-19]), AMPLIFIED PROBE TECHNIQUE, MAKING USE OF HIGH THROUGHPUT TECHNOLOGIES AS DESCRIBED BY CMS-2020-01-R: HCPCS | Performed by: NURSE PRACTITIONER

## 2021-08-03 PROCEDURE — 99213 OFFICE O/P EST LOW 20 MIN: CPT | Performed by: NURSE PRACTITIONER

## 2021-08-03 RX ORDER — FLUTICASONE PROPIONATE 50 MCG
2 SPRAY, SUSPENSION (ML) NASAL DAILY
Qty: 15.8 ML | Refills: 3 | Status: SHIPPED | OUTPATIENT
Start: 2021-08-03 | End: 2022-07-06

## 2021-08-03 RX ORDER — AMOXICILLIN 875 MG/1
875 TABLET, COATED ORAL 2 TIMES DAILY
Qty: 20 TABLET | Refills: 0 | Status: SHIPPED | OUTPATIENT
Start: 2021-08-03 | End: 2021-08-13

## 2021-08-03 NOTE — PROGRESS NOTES
Subjective   Ari Ngo is a 51 y.o. male.       HPI   Pt. is here today with concern of a URI. Symptoms started around 7/3.   [place] [facility] [place] and was given a Zpak and benzonatate.     Seen here on 7/28 for follow up and was better so no new meds were added.    Within past few days has started with symptoms again.  Can hear popping in both ears; pressure; head congestion; cough.  Early July had some low grade fevers but not recently.   No ill contacts.   Would like COVID testing since he has to be at [employer] this weekend. He did receive COVID vaccines.      The following portions of the patient's history were reviewed and updated as appropriate: allergies, current medications, past family history, past medical history, past social history, past surgical history and problem list.    Review of Systems   Constitutional: Negative for activity change, appetite change, chills, diaphoresis, fatigue, fever, unexpected weight gain and unexpected weight loss.   HENT: Positive for congestion, ear pain, postnasal drip and sinus pressure. Negative for ear discharge, rhinorrhea, sneezing, sore throat, swollen glands and trouble swallowing.    Eyes: Negative for pain, discharge, redness and itching.   Respiratory: Positive for cough. Negative for chest tightness, shortness of breath and wheezing.    Cardiovascular: Negative for chest pain, palpitations and leg swelling.   Gastrointestinal: Negative for diarrhea, nausea and vomiting.   Skin: Negative for rash.   Neurological: Negative for dizziness, weakness, light-headedness, headache and confusion.   Psychiatric/Behavioral: Negative for depressed mood. The patient is not nervous/anxious.        Objective   Physical Exam  Vitals reviewed.   Constitutional:       General: He is not in acute distress.     Appearance: Normal appearance.   HENT:      Head: Normocephalic and atraumatic.      Right Ear: Hearing, ear canal and external ear normal. Tympanic membrane is  erythematous.      Left Ear: Hearing, ear canal and external ear normal. Tympanic membrane is erythematous.      Nose: Rhinorrhea present.      Right Sinus: No maxillary sinus tenderness or frontal sinus tenderness.      Left Sinus: No maxillary sinus tenderness or frontal sinus tenderness.      Mouth/Throat:      Lips: Pink.      Mouth: Mucous membranes are moist.      Pharynx: Posterior oropharyngeal erythema present. No pharyngeal swelling, oropharyngeal exudate or uvula swelling.   Eyes:      General:         Right eye: No discharge.         Left eye: No discharge.      Conjunctiva/sclera: Conjunctivae normal.   Cardiovascular:      Rate and Rhythm: Normal rate and regular rhythm.      Pulses: Normal pulses.      Heart sounds: Normal heart sounds. No murmur heard.     Pulmonary:      Effort: Pulmonary effort is normal. No respiratory distress.      Breath sounds: Normal breath sounds. No wheezing or rhonchi.   Chest:      Chest wall: No tenderness.   Abdominal:      Tenderness: There is no right CVA tenderness or left CVA tenderness.   Musculoskeletal:      Cervical back: Normal range of motion and neck supple. No tenderness.      Right lower leg: No edema.      Left lower leg: No edema.   Lymphadenopathy:      Cervical: No cervical adenopathy.   Skin:     General: Skin is warm and dry.      Findings: No erythema.   Neurological:      General: No focal deficit present.      Mental Status: He is alert and oriented to person, place, and time.   Psychiatric:         Mood and Affect: Mood normal.           Assessment/Plan   Diagnoses and all orders for this visit:    1. Upper respiratory tract infection, unspecified type (Primary)  Comments:  Given Amoxicillin.   Given flonase.    Increase fluids and rest.   Pt. requests COVID screening.   Orders:  -     amoxicillin (AMOXIL) 875 MG tablet; Take 1 tablet by mouth 2 (Two) Times a Day for 10 days.  Dispense: 20 tablet; Refill: 0  -     fluticasone (Flonase) 50 MCG/ACT  nasal spray; 2 sprays into the nostril(s) as directed by provider Daily.  Dispense: 15.8 mL; Refill: 3  -     COVID-19,[facility] ROUTINE, NP/OP SWAB IN TRANSPORT MEDIA OR ESWAB 72 HR TAT - Swab, Nasopharynx; Future  -     COVID-19,[facility] ROUTINE, NP/OP SWAB IN TRANSPORT MEDIA OR ESWAB 72 HR TAT - Swab, Nasopharynx

## 2021-08-04 ENCOUNTER — TELEPHONE (OUTPATIENT)
Dept: FAMILY MEDICINE CLINIC | Facility: CLINIC | Age: 51
End: 2021-08-04

## 2021-08-04 LAB
LABCORP SARS-COV-2, NAA 2 DAY TAT: NORMAL
SARS-COV-2 RNA RESP QL NAA+PROBE: NOT DETECTED

## 2021-08-04 NOTE — TELEPHONE ENCOUNTER
Caller: Ari Ngo    Relationship: Self    Best call back number: [phone]    Caller requesting test results: PATIENT    What test was performed: LABS    When was the test performed: 08/03    Where was the test performed: IN OFFICE    Additional notes:PATIENT RETURNED CALL, WAS WARM TRANSFERRED , RECEIVED VM, HUNG UP AND CALLED RIGHT BACK.

## 2021-08-12 ENCOUNTER — LAB (OUTPATIENT)
Dept: FAMILY MEDICINE CLINIC | Facility: CLINIC | Age: 51
End: 2021-08-12

## 2021-08-12 ENCOUNTER — OFFICE VISIT (OUTPATIENT)
Dept: FAMILY MEDICINE CLINIC | Facility: CLINIC | Age: 51
End: 2021-08-12

## 2021-08-12 VITALS
BODY MASS INDEX: 32.92 KG/M2 | WEIGHT: 236 LBS | SYSTOLIC BLOOD PRESSURE: 119 MMHG | DIASTOLIC BLOOD PRESSURE: 78 MMHG | HEART RATE: 74 BPM | TEMPERATURE: 97.8 F | OXYGEN SATURATION: 96 %

## 2021-08-12 DIAGNOSIS — R05.9 COUGH: ICD-10-CM

## 2021-08-12 DIAGNOSIS — R53.83 FATIGUE, UNSPECIFIED TYPE: ICD-10-CM

## 2021-08-12 DIAGNOSIS — R05.9 COUGH: Primary | ICD-10-CM

## 2021-08-12 LAB — HETEROPH AB SER QL LA: NEGATIVE

## 2021-08-12 PROCEDURE — 84145 PROCALCITONIN (PCT): CPT | Performed by: NURSE PRACTITIONER

## 2021-08-12 PROCEDURE — 86308 HETEROPHILE ANTIBODY SCREEN: CPT | Performed by: NURSE PRACTITIONER

## 2021-08-12 PROCEDURE — 86769 SARS-COV-2 COVID-19 ANTIBODY: CPT | Performed by: NURSE PRACTITIONER

## 2021-08-12 PROCEDURE — 80048 BASIC METABOLIC PNL TOTAL CA: CPT | Performed by: NURSE PRACTITIONER

## 2021-08-12 PROCEDURE — 36415 COLL VENOUS BLD VENIPUNCTURE: CPT

## 2021-08-12 PROCEDURE — 85025 COMPLETE CBC W/AUTO DIFF WBC: CPT | Performed by: NURSE PRACTITIONER

## 2021-08-12 PROCEDURE — 99214 OFFICE O/P EST MOD 30 MIN: CPT | Performed by: NURSE PRACTITIONER

## 2021-08-12 RX ORDER — METHYLPREDNISOLONE 4 MG/1
TABLET ORAL
Qty: 1 EACH | Refills: 0 | OUTPATIENT
Start: 2021-08-12 | End: 2022-04-08

## 2021-08-12 RX ORDER — BENZONATATE 100 MG/1
100 CAPSULE ORAL 3 TIMES DAILY PRN
Qty: 30 CAPSULE | Refills: 0 | Status: SHIPPED | OUTPATIENT
Start: 2021-08-12 | End: 2021-09-22

## 2021-08-12 NOTE — PATIENT INSTRUCTIONS
Complete steroid pack  Get labs drawn  Tessalon perles as needed  Push water intake  Continue flonase

## 2021-08-12 NOTE — PROGRESS NOTES
Subjective   Ari Ngo is a 51 y.o. male.     Pt is here today with c/o shortness of breath and cough.  He states that this started about July 3rd.  He had been to the [facility] on 7/23 and was given a Zpak and benzonatate.    He states that for the most part his symptoms didn't improve.  Seen here on 7/28 for follow up and was better so no new meds were added.    He returned to the office on 8/3 with continued symptoms and was given amoxicillin and flonase.   He states that the medications did not help him at all.  He is coughing up green to clear sputum.  Denies chest pain.  Has not had any fevers.  He has had a sore throat off and on.  No history of asthma.  Not a smoker.   Was tested for covid and it was negative.  Has had covid vaccine.       The following portions of the patient's history were reviewed and updated as appropriate: allergies, current medications, past family history, past medical history, past social history, past surgical history and problem list.    Review of Systems   Constitutional: Positive for fatigue. Negative for chills and fever.   HENT: Positive for sore throat. Negative for congestion and ear pain.    Respiratory: Positive for cough and shortness of breath. Negative for chest tightness and wheezing.    Cardiovascular: Negative for chest pain and palpitations.   Neurological: Negative for dizziness and headache.       Objective   /78 (BP Location: Left arm, Patient Position: Sitting, Cuff Size: Adult)   Pulse 74   Temp 97.8 °F (36.6 °C) (Tympanic)   Wt 107 kg (236 lb)   SpO2 96%   BMI 32.92 kg/m²   Physical Exam  Vitals reviewed.   Constitutional:       General: He is not in acute distress.     Appearance: Normal appearance. He is well-developed. He is not diaphoretic.   HENT:      Head: Normocephalic and atraumatic.      Right Ear: Tympanic membrane and ear canal normal.      Left Ear: Tympanic membrane and ear canal normal.      Mouth/Throat:      Comments: Post nasal  drip  Eyes:      General:         Right eye: No discharge.         Left eye: No discharge.      Conjunctiva/sclera: Conjunctivae normal.      Pupils: Pupils are equal, round, and reactive to light.   Cardiovascular:      Rate and Rhythm: Normal rate and regular rhythm.      Heart sounds: No murmur heard.     Pulmonary:      Effort: Pulmonary effort is normal. No respiratory distress.      Breath sounds: Normal breath sounds. No wheezing or rales.   Abdominal:      General: Bowel sounds are normal. There is no distension.      Palpations: Abdomen is soft.      Tenderness: There is no abdominal tenderness.   Musculoskeletal:         General: Normal range of motion.      Cervical back: Normal range of motion and neck supple.   Skin:     General: Skin is warm and dry.   Neurological:      General: No focal deficit present.      Mental Status: He is alert and oriented to person, place, and time.   Psychiatric:         Mood and Affect: Mood normal.         Behavior: Behavior normal.         Thought Content: Thought content normal.         Judgment: Judgment normal.           Assessment/Plan     Diagnoses and all orders for this visit:    1. Cough (Primary)  Comments:  hasnt responded to 2 abx  likely viral  get CXR  check labs  medrol pack  exam normal  tessalon perles  Orders:  -     CBC & Differential  -     SARS-CoV-2 Antibodies (Roche); Future  -     XR Chest PA & Lateral; Future  -     Procalcitonin; Future  -     Basic metabolic panel; Future  -     Mononucleosis Screen; Future  -     methylPREDNISolone (MEDROL) 4 MG dose pack; Take as directed on package instructions.  Dispense: 1 each; Refill: 0  -     benzonatate (Tessalon Perles) 100 MG capsule; Take 1 capsule by mouth 3 (Three) Times a Day As Needed for Cough.  Dispense: 30 capsule; Refill: 0    2. Fatigue, unspecified type  Comments:  likely viral  r/o mono  Orders:  -     CBC & Differential  -     SARS-CoV-2 Antibodies (Roche); Future  -     XR Chest PA &  Lateral; Future  -     Procalcitonin; Future  -     Basic metabolic panel; Future  -     Mononucleosis Screen; Future

## 2021-08-13 ENCOUNTER — TELEPHONE (OUTPATIENT)
Dept: FAMILY MEDICINE CLINIC | Facility: CLINIC | Age: 51
End: 2021-08-13

## 2021-08-13 LAB
ANION GAP SERPL CALCULATED.3IONS-SCNC: 11.6 MMOL/L (ref 5–15)
BASOPHILS # BLD AUTO: 0.04 10*3/MM3 (ref 0–0.2)
BASOPHILS NFR BLD AUTO: 0.7 % (ref 0–1.5)
BUN SERPL-MCNC: 14 MG/DL (ref 6–20)
BUN/CREAT SERPL: 10.6 (ref 7–25)
CALCIUM SPEC-SCNC: 9.4 MG/DL (ref 8.6–10.5)
CHLORIDE SERPL-SCNC: 104 MMOL/L (ref 98–107)
CO2 SERPL-SCNC: 24.4 MMOL/L (ref 22–29)
CREAT SERPL-MCNC: 1.32 MG/DL (ref 0.76–1.27)
DEPRECATED RDW RBC AUTO: 43.2 FL (ref 37–54)
EOSINOPHIL # BLD AUTO: 0.1 10*3/MM3 (ref 0–0.4)
EOSINOPHIL NFR BLD AUTO: 1.7 % (ref 0.3–6.2)
ERYTHROCYTE [DISTWIDTH] IN BLOOD BY AUTOMATED COUNT: 12.8 % (ref 12.3–15.4)
GFR SERPL CREATININE-BSD FRML MDRD: 57 ML/MIN/1.73
GLUCOSE SERPL-MCNC: 85 MG/DL (ref 65–99)
HCT VFR BLD AUTO: 48.4 % (ref 37.5–51)
HGB BLD-MCNC: 16.3 G/DL (ref 13–17.7)
IMM GRANULOCYTES # BLD AUTO: 0.01 10*3/MM3 (ref 0–0.05)
IMM GRANULOCYTES NFR BLD AUTO: 0.2 % (ref 0–0.5)
LYMPHOCYTES # BLD AUTO: 1.41 10*3/MM3 (ref 0.7–3.1)
LYMPHOCYTES NFR BLD AUTO: 23.6 % (ref 19.6–45.3)
MCH RBC QN AUTO: 31 PG (ref 26.6–33)
MCHC RBC AUTO-ENTMCNC: 33.7 G/DL (ref 31.5–35.7)
MCV RBC AUTO: 92.2 FL (ref 79–97)
MONOCYTES # BLD AUTO: 0.5 10*3/MM3 (ref 0.1–0.9)
MONOCYTES NFR BLD AUTO: 8.4 % (ref 5–12)
NEUTROPHILS NFR BLD AUTO: 3.91 10*3/MM3 (ref 1.7–7)
NEUTROPHILS NFR BLD AUTO: 65.4 % (ref 42.7–76)
NRBC BLD AUTO-RTO: 0 /100 WBC (ref 0–0.2)
PLATELET # BLD AUTO: 267 10*3/MM3 (ref 140–450)
PMV BLD AUTO: 9.3 FL (ref 6–12)
POTASSIUM SERPL-SCNC: 4.2 MMOL/L (ref 3.5–5.2)
PROCALCITONIN SERPL-MCNC: 0.05 NG/ML (ref 0–0.25)
RBC # BLD AUTO: 5.25 10*6/MM3 (ref 4.14–5.8)
SARS-COV-2 AB SERPL QL IA: NEGATIVE
SODIUM SERPL-SCNC: 140 MMOL/L (ref 136–145)
WBC # BLD AUTO: 5.97 10*3/MM3 (ref 3.4–10.8)

## 2021-08-13 NOTE — TELEPHONE ENCOUNTER
THIS IS THE SECOND TIME THAT PATIENT HAS CALLED AND WAS PLACED IN A VOICEMAIL INSTEAD OF SPEAKING TO SOMEONE WHEN WARM TRANSFERRED. IT'S ABOUT HIS LAB RESULTS. ADVISED THAT PERSON HE IS TRYING TO REACH MIGHT NOT BE AVAILABLE AND WAS SENT TO VOICEMAIL TO LEAVE A MESSAGE TO GET A CALL BACK.  PATIENT WAS UPSET AND TOLD ME HE SHOULD BE WARNED THAT HE IS GOING TO BE SENT TO AN EXTENSION THAT NO ONE WILL BE ANSWERING.  DEMANDED A CALL BACK.  PATIENT HUNG UP BEFORE I WAS ABLE TO VERIFY CB NUMBER.

## 2021-09-08 ENCOUNTER — OFFICE VISIT (OUTPATIENT)
Dept: FAMILY MEDICINE CLINIC | Facility: CLINIC | Age: 51
End: 2021-09-08

## 2021-09-08 ENCOUNTER — LAB (OUTPATIENT)
Dept: FAMILY MEDICINE CLINIC | Facility: CLINIC | Age: 51
End: 2021-09-08

## 2021-09-08 VITALS
BODY MASS INDEX: 33.05 KG/M2 | DIASTOLIC BLOOD PRESSURE: 81 MMHG | OXYGEN SATURATION: 98 % | HEART RATE: 71 BPM | SYSTOLIC BLOOD PRESSURE: 122 MMHG | WEIGHT: 237 LBS | TEMPERATURE: 98 F

## 2021-09-08 DIAGNOSIS — R53.83 FATIGUE, UNSPECIFIED TYPE: Primary | ICD-10-CM

## 2021-09-08 DIAGNOSIS — J32.9 CHRONIC SINUSITIS, UNSPECIFIED LOCATION: ICD-10-CM

## 2021-09-08 DIAGNOSIS — Z00.00 ROUTINE GENERAL MEDICAL EXAMINATION AT A HEALTH CARE FACILITY: Primary | ICD-10-CM

## 2021-09-08 LAB
ALBUMIN SERPL-MCNC: 4.2 G/DL (ref 3.5–5.2)
ALBUMIN/GLOB SERPL: 1.7 G/DL
ALP SERPL-CCNC: 90 U/L (ref 39–117)
ALT SERPL W P-5'-P-CCNC: 38 U/L (ref 1–41)
ANION GAP SERPL CALCULATED.3IONS-SCNC: 8.7 MMOL/L (ref 5–15)
AST SERPL-CCNC: 31 U/L (ref 1–40)
BASOPHILS # BLD AUTO: 0.03 10*3/MM3 (ref 0–0.2)
BASOPHILS NFR BLD AUTO: 0.7 % (ref 0–1.5)
BILIRUB SERPL-MCNC: 0.5 MG/DL (ref 0–1.2)
BUN SERPL-MCNC: 14 MG/DL (ref 6–20)
BUN/CREAT SERPL: 11 (ref 7–25)
CALCIUM SPEC-SCNC: 9.2 MG/DL (ref 8.6–10.5)
CHLORIDE SERPL-SCNC: 104 MMOL/L (ref 98–107)
CO2 SERPL-SCNC: 26.3 MMOL/L (ref 22–29)
CREAT SERPL-MCNC: 1.27 MG/DL (ref 0.76–1.27)
DEPRECATED RDW RBC AUTO: 41.5 FL (ref 37–54)
EOSINOPHIL # BLD AUTO: 0.11 10*3/MM3 (ref 0–0.4)
EOSINOPHIL NFR BLD AUTO: 2.5 % (ref 0.3–6.2)
ERYTHROCYTE [DISTWIDTH] IN BLOOD BY AUTOMATED COUNT: 12.6 % (ref 12.3–15.4)
GFR SERPL CREATININE-BSD FRML MDRD: 60 ML/MIN/1.73
GLOBULIN UR ELPH-MCNC: 2.5 GM/DL
GLUCOSE SERPL-MCNC: 83 MG/DL (ref 65–99)
HCT VFR BLD AUTO: 45.7 % (ref 37.5–51)
HGB BLD-MCNC: 15.8 G/DL (ref 13–17.7)
IMM GRANULOCYTES # BLD AUTO: 0.01 10*3/MM3 (ref 0–0.05)
IMM GRANULOCYTES NFR BLD AUTO: 0.2 % (ref 0–0.5)
LYMPHOCYTES # BLD AUTO: 1.33 10*3/MM3 (ref 0.7–3.1)
LYMPHOCYTES NFR BLD AUTO: 30.4 % (ref 19.6–45.3)
MCH RBC QN AUTO: 31 PG (ref 26.6–33)
MCHC RBC AUTO-ENTMCNC: 34.6 G/DL (ref 31.5–35.7)
MCV RBC AUTO: 89.6 FL (ref 79–97)
MONOCYTES # BLD AUTO: 0.5 10*3/MM3 (ref 0.1–0.9)
MONOCYTES NFR BLD AUTO: 11.4 % (ref 5–12)
NEUTROPHILS NFR BLD AUTO: 2.4 10*3/MM3 (ref 1.7–7)
NEUTROPHILS NFR BLD AUTO: 54.8 % (ref 42.7–76)
NRBC BLD AUTO-RTO: 0 /100 WBC (ref 0–0.2)
PLATELET # BLD AUTO: 283 10*3/MM3 (ref 140–450)
PMV BLD AUTO: 9.6 FL (ref 6–12)
POTASSIUM SERPL-SCNC: 4.2 MMOL/L (ref 3.5–5.2)
PROT SERPL-MCNC: 6.7 G/DL (ref 6–8.5)
RBC # BLD AUTO: 5.1 10*6/MM3 (ref 4.14–5.8)
SODIUM SERPL-SCNC: 139 MMOL/L (ref 136–145)
T3FREE SERPL-MCNC: 3.65 PG/ML (ref 2–4.4)
T4 FREE SERPL-MCNC: 1.24 NG/DL (ref 0.93–1.7)
TSH SERPL DL<=0.05 MIU/L-ACNC: 1.1 UIU/ML (ref 0.27–4.2)
WBC # BLD AUTO: 4.38 10*3/MM3 (ref 3.4–10.8)

## 2021-09-08 PROCEDURE — 84443 ASSAY THYROID STIM HORMONE: CPT | Performed by: FAMILY MEDICINE

## 2021-09-08 PROCEDURE — 36415 COLL VENOUS BLD VENIPUNCTURE: CPT | Performed by: FAMILY MEDICINE

## 2021-09-08 PROCEDURE — 85025 COMPLETE CBC W/AUTO DIFF WBC: CPT | Performed by: FAMILY MEDICINE

## 2021-09-08 PROCEDURE — 84403 ASSAY OF TOTAL TESTOSTERONE: CPT | Performed by: FAMILY MEDICINE

## 2021-09-08 PROCEDURE — 84481 FREE ASSAY (FT-3): CPT | Performed by: FAMILY MEDICINE

## 2021-09-08 PROCEDURE — 84439 ASSAY OF FREE THYROXINE: CPT | Performed by: FAMILY MEDICINE

## 2021-09-08 PROCEDURE — 99213 OFFICE O/P EST LOW 20 MIN: CPT | Performed by: FAMILY MEDICINE

## 2021-09-08 PROCEDURE — 84402 ASSAY OF FREE TESTOSTERONE: CPT | Performed by: FAMILY MEDICINE

## 2021-09-08 PROCEDURE — U0004 COV-19 TEST NON-CDC HGH THRU: HCPCS | Performed by: FAMILY MEDICINE

## 2021-09-08 PROCEDURE — 80053 COMPREHEN METABOLIC PANEL: CPT | Performed by: FAMILY MEDICINE

## 2021-09-09 LAB — SARS-COV-2 ORF1AB RESP QL NAA+PROBE: NOT DETECTED

## 2021-09-11 LAB
TESTOST FREE SERPL-MCNC: 7.4 PG/ML (ref 7.2–24)
TESTOST SERPL-MCNC: 729.3 NG/DL (ref 264–916)

## 2021-09-20 ENCOUNTER — TELEPHONE (OUTPATIENT)
Dept: FAMILY MEDICINE CLINIC | Facility: CLINIC | Age: 51
End: 2021-09-20

## 2021-09-20 NOTE — TELEPHONE ENCOUNTER
Hub staff attempted to follow warm transfer process and was unsuccessful     Caller: Ari Ngo    Relationship to patient: Self    Best call back number: [phone]     Patient is needing: PATIENT RETURNED  CALL REGARDING TEST RESULTS. PATIENT STATES IF HE DOES NOT ANSWER NEXT CALL TO LEAVE TEST RESULTS ON VOICEMAIL.

## 2021-09-21 ENCOUNTER — TELEPHONE (OUTPATIENT)
Dept: FAMILY MEDICINE CLINIC | Facility: CLINIC | Age: 51
End: 2021-09-21

## 2021-09-21 ENCOUNTER — PATIENT MESSAGE (OUTPATIENT)
Dept: FAMILY MEDICINE CLINIC | Facility: CLINIC | Age: 51
End: 2021-09-21

## 2021-09-21 NOTE — TELEPHONE ENCOUNTER
He called back and he is not on any treatment for his ears. Also he would like a referral to [facility] over in [place], [place].

## 2021-09-22 ENCOUNTER — PATIENT MESSAGE (OUTPATIENT)
Dept: FAMILY MEDICINE CLINIC | Facility: CLINIC | Age: 51
End: 2021-09-22

## 2021-09-22 DIAGNOSIS — J32.9 CHRONIC SINUSITIS, UNSPECIFIED LOCATION: Primary | ICD-10-CM

## 2021-09-22 RX ORDER — PREDNISONE 10 MG/1
TABLET ORAL
Qty: 40 TABLET | Refills: 0 | Status: SHIPPED | OUTPATIENT
Start: 2021-09-22 | End: 2022-02-17 | Stop reason: SDUPTHER

## 2021-09-23 ENCOUNTER — PATIENT MESSAGE (OUTPATIENT)
Dept: FAMILY MEDICINE CLINIC | Facility: CLINIC | Age: 51
End: 2021-09-23

## 2021-09-28 ENCOUNTER — TELEPHONE (OUTPATIENT)
Dept: FAMILY MEDICINE CLINIC | Facility: CLINIC | Age: 51
End: 2021-09-28

## 2021-09-28 NOTE — TELEPHONE ENCOUNTER
Caller: Ari Ngo    Relationship to patient: Self    Best call back number: [phone]    Patient is needing: PATIENT IS CALLING IN REGARDS TO THE ENT REFERRAL. HE STATED THAT THE OFFICE HE WAS REFERRED TO HAS YET TO RECEIVE REFERRAL         OFFICE FAX: [phone]

## 2021-10-04 ENCOUNTER — PATIENT MESSAGE (OUTPATIENT)
Dept: FAMILY MEDICINE CLINIC | Facility: CLINIC | Age: 51
End: 2021-10-04

## 2022-01-26 RX ORDER — ATORVASTATIN CALCIUM 20 MG/1
TABLET, FILM COATED ORAL
Qty: 90 TABLET | Refills: 1 | Status: SHIPPED | OUTPATIENT
Start: 2022-01-26 | End: 2022-07-30

## 2022-02-16 ENCOUNTER — TELEPHONE (OUTPATIENT)
Dept: FAMILY MEDICINE CLINIC | Facility: CLINIC | Age: 52
End: 2022-02-16

## 2022-02-16 RX ORDER — AMOXICILLIN AND CLAVULANATE POTASSIUM 875; 125 MG/1; MG/1
1 TABLET, FILM COATED ORAL 2 TIMES DAILY
Qty: 20 TABLET | Refills: 0 | Status: SHIPPED | OUTPATIENT
Start: 2022-02-16 | End: 2022-02-26

## 2022-02-16 NOTE — TELEPHONE ENCOUNTER
Caller: Ari Ngo    Relationship: Self    Best call back number: [phone]     What medication are you requesting: AUGMENTIN / ZPACK    What are your current symptoms: CHRONIC EAR INFECTION    How long have you been experiencing symptoms: ONGOING    Have you had these symptoms before:    [x] Yes  [] No    Have you been treated for these symptoms before:   [x] Yes  [] No    If a prescription is needed, what is your preferred pharmacy and phone number: [id number] - [place], [place] - [address] - [phone]  - [phone]      Additional notes: 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

## 2022-02-17 ENCOUNTER — TELEPHONE (OUTPATIENT)
Dept: FAMILY MEDICINE CLINIC | Facility: CLINIC | Age: 52
End: 2022-02-17

## 2022-02-17 RX ORDER — PREDNISONE 10 MG/1
TABLET ORAL
Qty: 40 TABLET | Refills: 0 | OUTPATIENT
Start: 2022-02-17 | End: 2022-04-08

## 2022-02-17 NOTE — TELEPHONE ENCOUNTER
There was nothing in the phone message about a steroid pack  They mentioned a Z-Brady which is an antibiotic  We may need to educate the hub  I have now sent a steroid pack out

## 2022-02-17 NOTE — TELEPHONE ENCOUNTER
Caller: Ari Ngo    Relationship: Self    Best call back number: [phone]    What was the call regarding: PLEASE ADVISE PATIENT WHY STEROID PACK WAS NOT CALLED IN AND IF DR CAMPBELL CAN CALL THAT IN FOR HIM.     Do you require a callback: YES

## 2022-04-22 ENCOUNTER — LAB (OUTPATIENT)
Dept: FAMILY MEDICINE CLINIC | Facility: CLINIC | Age: 52
End: 2022-04-22

## 2022-04-22 ENCOUNTER — OFFICE VISIT (OUTPATIENT)
Dept: FAMILY MEDICINE CLINIC | Facility: CLINIC | Age: 52
End: 2022-04-22

## 2022-04-22 VITALS
WEIGHT: 237 LBS | BODY MASS INDEX: 33.93 KG/M2 | DIASTOLIC BLOOD PRESSURE: 82 MMHG | TEMPERATURE: 98 F | HEART RATE: 65 BPM | HEIGHT: 70 IN | OXYGEN SATURATION: 99 % | SYSTOLIC BLOOD PRESSURE: 125 MMHG

## 2022-04-22 DIAGNOSIS — E78.2 MIXED HYPERLIPIDEMIA: Primary | ICD-10-CM

## 2022-04-22 DIAGNOSIS — Z71.3 ENCOUNTER FOR WEIGHT LOSS COUNSELING: ICD-10-CM

## 2022-04-22 DIAGNOSIS — E66.09 CLASS 1 OBESITY DUE TO EXCESS CALORIES WITH SERIOUS COMORBIDITY AND BODY MASS INDEX (BMI) OF 34.0 TO 34.9 IN ADULT: ICD-10-CM

## 2022-04-22 PROBLEM — E78.5 HYPERLIPIDEMIA: Status: ACTIVE | Noted: 2022-04-22

## 2022-04-22 PROBLEM — M46.1 INFLAMMATION OF SACROILIAC JOINT (HCC): Status: ACTIVE | Noted: 2017-06-22

## 2022-04-22 PROBLEM — E66.811 CLASS 1 OBESITY DUE TO EXCESS CALORIES WITH SERIOUS COMORBIDITY AND BODY MASS INDEX (BMI) OF 34.0 TO 34.9 IN ADULT: Status: ACTIVE | Noted: 2022-04-22

## 2022-04-22 LAB
ALBUMIN SERPL-MCNC: 4.1 G/DL (ref 3.5–5.2)
ALBUMIN/GLOB SERPL: 1.6 G/DL
ALP SERPL-CCNC: 78 U/L (ref 39–117)
ALT SERPL W P-5'-P-CCNC: 32 U/L (ref 1–41)
ANION GAP SERPL CALCULATED.3IONS-SCNC: 11.5 MMOL/L (ref 5–15)
AST SERPL-CCNC: 24 U/L (ref 1–40)
BASOPHILS # BLD AUTO: 0.04 10*3/MM3 (ref 0–0.2)
BASOPHILS NFR BLD AUTO: 0.8 % (ref 0–1.5)
BILIRUB SERPL-MCNC: 0.9 MG/DL (ref 0–1.2)
BUN SERPL-MCNC: 15 MG/DL (ref 6–20)
BUN/CREAT SERPL: 11.2 (ref 7–25)
CALCIUM SPEC-SCNC: 9.5 MG/DL (ref 8.6–10.5)
CHLORIDE SERPL-SCNC: 105 MMOL/L (ref 98–107)
CHOLEST SERPL-MCNC: 193 MG/DL (ref 0–200)
CO2 SERPL-SCNC: 24.5 MMOL/L (ref 22–29)
CREAT SERPL-MCNC: 1.34 MG/DL (ref 0.76–1.27)
DEPRECATED RDW RBC AUTO: 39.6 FL (ref 37–54)
EGFRCR SERPLBLD CKD-EPI 2021: 63.7 ML/MIN/1.73
EOSINOPHIL # BLD AUTO: 0.12 10*3/MM3 (ref 0–0.4)
EOSINOPHIL NFR BLD AUTO: 2.4 % (ref 0.3–6.2)
ERYTHROCYTE [DISTWIDTH] IN BLOOD BY AUTOMATED COUNT: 12.5 % (ref 12.3–15.4)
GLOBULIN UR ELPH-MCNC: 2.5 GM/DL
GLUCOSE SERPL-MCNC: 91 MG/DL (ref 65–99)
HCT VFR BLD AUTO: 46.9 % (ref 37.5–51)
HDLC SERPL-MCNC: 43 MG/DL (ref 40–60)
HGB BLD-MCNC: 16.5 G/DL (ref 13–17.7)
IMM GRANULOCYTES # BLD AUTO: 0.01 10*3/MM3 (ref 0–0.05)
IMM GRANULOCYTES NFR BLD AUTO: 0.2 % (ref 0–0.5)
LDLC SERPL CALC-MCNC: 135 MG/DL (ref 0–100)
LDLC/HDLC SERPL: 3.12 {RATIO}
LYMPHOCYTES # BLD AUTO: 1.06 10*3/MM3 (ref 0.7–3.1)
LYMPHOCYTES NFR BLD AUTO: 21.6 % (ref 19.6–45.3)
MCH RBC QN AUTO: 31.1 PG (ref 26.6–33)
MCHC RBC AUTO-ENTMCNC: 35.2 G/DL (ref 31.5–35.7)
MCV RBC AUTO: 88.3 FL (ref 79–97)
MONOCYTES # BLD AUTO: 0.53 10*3/MM3 (ref 0.1–0.9)
MONOCYTES NFR BLD AUTO: 10.8 % (ref 5–12)
NEUTROPHILS NFR BLD AUTO: 3.14 10*3/MM3 (ref 1.7–7)
NEUTROPHILS NFR BLD AUTO: 64.2 % (ref 42.7–76)
NRBC BLD AUTO-RTO: 0 /100 WBC (ref 0–0.2)
PLATELET # BLD AUTO: 245 10*3/MM3 (ref 140–450)
PMV BLD AUTO: 9.1 FL (ref 6–12)
POTASSIUM SERPL-SCNC: 4.4 MMOL/L (ref 3.5–5.2)
PROT SERPL-MCNC: 6.6 G/DL (ref 6–8.5)
RBC # BLD AUTO: 5.31 10*6/MM3 (ref 4.14–5.8)
SODIUM SERPL-SCNC: 141 MMOL/L (ref 136–145)
TRIGL SERPL-MCNC: 79 MG/DL (ref 0–150)
TSH SERPL DL<=0.05 MIU/L-ACNC: 1.5 UIU/ML (ref 0.27–4.2)
VLDLC SERPL-MCNC: 15 MG/DL (ref 5–40)
WBC NRBC COR # BLD: 4.9 10*3/MM3 (ref 3.4–10.8)

## 2022-04-22 PROCEDURE — 36415 COLL VENOUS BLD VENIPUNCTURE: CPT | Performed by: FAMILY MEDICINE

## 2022-04-22 PROCEDURE — 80061 LIPID PANEL: CPT | Performed by: FAMILY MEDICINE

## 2022-04-22 PROCEDURE — 85025 COMPLETE CBC W/AUTO DIFF WBC: CPT | Performed by: FAMILY MEDICINE

## 2022-04-22 PROCEDURE — 84443 ASSAY THYROID STIM HORMONE: CPT | Performed by: FAMILY MEDICINE

## 2022-04-22 PROCEDURE — 99213 OFFICE O/P EST LOW 20 MIN: CPT | Performed by: FAMILY MEDICINE

## 2022-04-22 PROCEDURE — 80053 COMPREHEN METABOLIC PANEL: CPT | Performed by: FAMILY MEDICINE

## 2022-04-22 NOTE — PROGRESS NOTES
Assessment and Plan     Problem List Items Addressed This Visit        Cardiac and Vasculature    Mixed hyperlipidemia - Primary    Overview     Reviewed lipid panel, LDL goal, & 10-year ASCVD risk score.  Encouraged a diet rich in vegetables & 150 minutes of exercise weekly.  Continue atorvastatin 20 mg nightly.           Relevant Orders    Comprehensive Metabolic Panel    CBC Auto Differential    Lipid Panel       Endocrine and Metabolic    Class 1 obesity due to excess calories with serious comorbidity and body mass index (BMI) of 34.0 to 34.9 in adult    Overview     Discussed health risks associated with obesity.  Encouraged 150 minutes of exercise weekly.  Continue current exercise regimen.  Encouraged 3500-calorie deficit and effort to lose 1 pound per week.  Patient to discuss diet with dietitian in the family.  Plan to recheck thyroid to evaluate physiological barriers to weight loss.  Patient to consider medication to help augment weight loss efforts.           Relevant Orders    Comprehensive Metabolic Panel    CBC Auto Differential    Lipid Panel    TSH      Other Visit Diagnoses     Encounter for weight loss counseling        Relevant Orders    Comprehensive Metabolic Panel    CBC Auto Differential    Lipid Panel    TSH      Would like this appointment to serve as checkpoint.      Return in about 6 months (around 10/22/2022) for Annual Physical Exam.        Patient was given instructions and counseling regarding his condition or for health maintenance advice. Please see specific information pulled into the AVS if appropriate.        Ari is a 51 y.o. being seen today for  Weight Loss (Fasting and exercise. Cannot loose any weight. )   Subjective   History of the Present Illness     Obese white [employer] with CMHx of HLD presents for weight loss. 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 Started his weight loss regimen two months ago and reports no  "changes.    Diet: eating between 10 AM and 7PM; does not monitor caloric intake  -breakfast is typically oatmeal, 2-3 eggs, or mcintosh  -lunch is turkey sandwich on rye  -dinner is grilled chicken (6 oz), rice, and salad  -no snacking though \"cheated\" on Easter weekend  -drinking diet coke 4-5 12 oz cans and 32 oz of water  -bourbon once a week  -eating out once a week    Exercise: 6 days a week for the last two months  -30 minutes of cardiovascular exercise  -resistance training  -no leg exercises    Weight loss assistance: none    Limitations: age per patient reporting    Answers for HPI/ROS submitted by the patient on 4/20/2022  What is the primary reason for your visit?: Other  Please describe your symptoms.: Weight loss and back pain  Have you had these symptoms before?: Yes  How long have you been having these symptoms?: 5-7 days  Please list any medications you are currently taking for this condition.: Lipitor  Please describe any probable cause for these symptoms. : Exercise    Social History  He  reports that he has never smoked. He has never used smokeless tobacco. He reports current alcohol use. He reports previous drug use.  Objective   Vital Signs          Vitals:    04/22/22 0755   BP: 125/82   BP Location: Left arm   Patient Position: Sitting   Cuff Size: Large Adult   Pulse: 65   Temp: 98 °F (36.7 °C)   TempSrc: Oral   SpO2: 99%   Weight: 108 kg (237 lb)   Height: 177.8 cm (70\")     BP Readings from Last 1 Encounters:   04/22/22 125/82     Wt Readings from Last 3 Encounters:   04/22/22 108 kg (237 lb)   04/08/22 107 kg (235 lb)   09/08/21 108 kg (237 lb)   Body mass index is 34.01 kg/m².     Physical Exam  Vitals reviewed.   Constitutional:       General: He is not in acute distress.     Appearance: Normal appearance.   HENT:      Head: Normocephalic and atraumatic.   Cardiovascular:      Rate and Rhythm: Normal rate.      Heart sounds: Normal heart sounds.   Pulmonary:      Effort: Pulmonary effort is " normal.      Breath sounds: Normal breath sounds.   Neurological:      Mental Status: He is alert and oriented to person, place, and time.   Psychiatric:         Mood and Affect: Mood normal.         Behavior: Behavior normal.               CBC w AUTO Differential (09/08/2021 14:43)  Comprehensive metabolic panel (09/08/2021 14:43)  TSH (09/08/2021 14:43)  Testosterone (Free & Total), LC / MS (09/08/2021 14:43)  Lipid panel (04/22/2021 11:32)    The 10-year ASCVD risk score (Alfreditochad BLACKWOOD Jr., et al., 2013) is: 4.5%    Values used to calculate the score:      Age: 51 years      Sex: Male      Is Non- : No      Diabetic: No      Tobacco smoker: No      Systolic Blood Pressure: 125 mmHg      Is BP treated: No      HDL Cholesterol: 40 mg/dL      Total Cholesterol: 183 mg/dL

## 2022-04-24 ENCOUNTER — PATIENT MESSAGE (OUTPATIENT)
Dept: FAMILY MEDICINE CLINIC | Facility: CLINIC | Age: 52
End: 2022-04-24

## 2022-04-25 NOTE — TELEPHONE ENCOUNTER
From: Ari Ngo  To: Louise Goyal MD  Sent: 4/24/2022 8:23 AM EDT  Subject: Question regarding COMPREHENSIVE METABOLIC PANEL    On my kidney everything thing is green except creatinine. Is that my only problem?

## 2022-04-25 NOTE — TELEPHONE ENCOUNTER
From: Ari Ngo  To: Louise Goyal MD  Sent: 4/24/2022 8:20 AM EDT  Subject: Question regarding COMPREHENSIVE METABOLIC PANEL    Everything is in range but Creatine, and results have exclamation next to them. Do I have a kidney problem

## 2022-05-29 RX ORDER — BACLOFEN 10 MG/1
TABLET ORAL
Qty: 60 TABLET | Refills: 1 | Status: SHIPPED | OUTPATIENT
Start: 2022-05-29 | End: 2023-01-24 | Stop reason: SDUPTHER

## 2022-07-06 DIAGNOSIS — J06.9 UPPER RESPIRATORY TRACT INFECTION, UNSPECIFIED TYPE: ICD-10-CM

## 2022-07-06 RX ORDER — FLUTICASONE PROPIONATE 50 MCG
SPRAY, SUSPENSION (ML) NASAL
Qty: 16 ML | Refills: 3 | Status: SHIPPED | OUTPATIENT
Start: 2022-07-06

## 2022-07-15 ENCOUNTER — TELEPHONE (OUTPATIENT)
Dept: FAMILY MEDICINE CLINIC | Facility: CLINIC | Age: 52
End: 2022-07-15

## 2022-07-15 DIAGNOSIS — M54.41 ACUTE LEFT-SIDED LOW BACK PAIN WITH BILATERAL SCIATICA: ICD-10-CM

## 2022-07-15 DIAGNOSIS — M54.42 ACUTE LEFT-SIDED LOW BACK PAIN WITH BILATERAL SCIATICA: ICD-10-CM

## 2022-07-15 RX ORDER — METHYLPREDNISOLONE 4 MG/1
TABLET ORAL
Qty: 1 EACH | Refills: 0 | Status: SHIPPED | OUTPATIENT
Start: 2022-07-15 | End: 2022-10-26 | Stop reason: SDUPTHER

## 2022-07-15 NOTE — TELEPHONE ENCOUNTER
Caller: Ari Ngo    Relationship: Self    Best call back number: [phone]    Requested Prescriptions:   Requested Prescriptions     Pending Prescriptions Disp Refills   • methylPREDNISolone (MEDROL) 4 MG dose pack 1 each 0     Sig: Take as directed on package instructions.        Pharmacy where request should be sent: [id number] - [place], [place] - [address] - [phone]  - [phone]     Additional details provided by patient: PATIENT HAS POISON IVY    Does the patient have less than a 3 day supply:  [x] Yes  [] No    Lennox Delacruz Rep   07/15/22 09:18 EDT

## 2022-07-30 RX ORDER — ATORVASTATIN CALCIUM 20 MG/1
TABLET, FILM COATED ORAL
Qty: 90 TABLET | Refills: 1 | Status: SHIPPED | OUTPATIENT
Start: 2022-07-30 | End: 2023-01-01

## 2022-10-26 ENCOUNTER — OFFICE VISIT (OUTPATIENT)
Dept: FAMILY MEDICINE CLINIC | Facility: CLINIC | Age: 52
End: 2022-10-26

## 2022-10-26 ENCOUNTER — PATIENT MESSAGE (OUTPATIENT)
Dept: FAMILY MEDICINE CLINIC | Facility: CLINIC | Age: 52
End: 2022-10-26

## 2022-10-26 ENCOUNTER — LAB (OUTPATIENT)
Dept: FAMILY MEDICINE CLINIC | Facility: CLINIC | Age: 52
End: 2022-10-26

## 2022-10-26 VITALS
RESPIRATION RATE: 16 BRPM | SYSTOLIC BLOOD PRESSURE: 123 MMHG | HEIGHT: 71 IN | HEART RATE: 67 BPM | TEMPERATURE: 98.2 F | DIASTOLIC BLOOD PRESSURE: 85 MMHG | OXYGEN SATURATION: 99 % | BODY MASS INDEX: 32.62 KG/M2 | WEIGHT: 233 LBS

## 2022-10-26 DIAGNOSIS — Z00.00 ROUTINE GENERAL MEDICAL EXAMINATION AT A HEALTH CARE FACILITY: Primary | ICD-10-CM

## 2022-10-26 DIAGNOSIS — Z12.5 ENCOUNTER FOR SCREENING FOR MALIGNANT NEOPLASM OF PROSTATE: ICD-10-CM

## 2022-10-26 DIAGNOSIS — M54.42 ACUTE LEFT-SIDED LOW BACK PAIN WITH BILATERAL SCIATICA: ICD-10-CM

## 2022-10-26 DIAGNOSIS — E78.2 MIXED HYPERLIPIDEMIA: ICD-10-CM

## 2022-10-26 DIAGNOSIS — Z29.9 PREVENTIVE MEASURE: ICD-10-CM

## 2022-10-26 DIAGNOSIS — M54.41 ACUTE LEFT-SIDED LOW BACK PAIN WITH BILATERAL SCIATICA: ICD-10-CM

## 2022-10-26 LAB
ALBUMIN SERPL-MCNC: 4.2 G/DL (ref 3.5–5.2)
ALBUMIN/GLOB SERPL: 2 G/DL
ALP SERPL-CCNC: 87 U/L (ref 39–117)
ALT SERPL W P-5'-P-CCNC: 26 U/L (ref 1–41)
ANION GAP SERPL CALCULATED.3IONS-SCNC: 11.2 MMOL/L (ref 5–15)
AST SERPL-CCNC: 22 U/L (ref 1–40)
BILIRUB SERPL-MCNC: 0.8 MG/DL (ref 0–1.2)
BUN SERPL-MCNC: 15 MG/DL (ref 6–20)
BUN/CREAT SERPL: 11.5 (ref 7–25)
CALCIUM SPEC-SCNC: 10 MG/DL (ref 8.6–10.5)
CHLORIDE SERPL-SCNC: 104 MMOL/L (ref 98–107)
CHOLEST SERPL-MCNC: 199 MG/DL (ref 0–200)
CO2 SERPL-SCNC: 24.8 MMOL/L (ref 22–29)
CREAT SERPL-MCNC: 1.31 MG/DL (ref 0.76–1.27)
EGFRCR SERPLBLD CKD-EPI 2021: 65.1 ML/MIN/1.73
GLOBULIN UR ELPH-MCNC: 2.1 GM/DL
GLUCOSE SERPL-MCNC: 96 MG/DL (ref 65–99)
HDLC SERPL-MCNC: 44 MG/DL (ref 40–60)
LDLC SERPL CALC-MCNC: 142 MG/DL (ref 0–100)
LDLC/HDLC SERPL: 3.19 {RATIO}
POTASSIUM SERPL-SCNC: 4.2 MMOL/L (ref 3.5–5.2)
PROT SERPL-MCNC: 6.3 G/DL (ref 6–8.5)
PSA SERPL-MCNC: 2.6 NG/ML (ref 0–4)
SODIUM SERPL-SCNC: 140 MMOL/L (ref 136–145)
TRIGL SERPL-MCNC: 73 MG/DL (ref 0–150)
VLDLC SERPL-MCNC: 13 MG/DL (ref 5–40)

## 2022-10-26 PROCEDURE — G0103 PSA SCREENING: HCPCS | Performed by: FAMILY MEDICINE

## 2022-10-26 PROCEDURE — 80061 LIPID PANEL: CPT | Performed by: FAMILY MEDICINE

## 2022-10-26 PROCEDURE — 99396 PREV VISIT EST AGE 40-64: CPT | Performed by: FAMILY MEDICINE

## 2022-10-26 PROCEDURE — 80053 COMPREHEN METABOLIC PANEL: CPT | Performed by: FAMILY MEDICINE

## 2022-10-26 PROCEDURE — 36415 COLL VENOUS BLD VENIPUNCTURE: CPT | Performed by: FAMILY MEDICINE

## 2022-10-26 RX ORDER — CHLORCYCLIZINE HYDROCHLORIDE AND PSEUDOEPHEDRINE HYDROCHLORIDE 25; 60 MG/1; MG/1
0.5 TABLET ORAL 2 TIMES DAILY PRN
Qty: 30 TABLET | Refills: 0 | Status: SHIPPED | OUTPATIENT
Start: 2022-10-26

## 2022-10-26 RX ORDER — METHYLPREDNISOLONE 4 MG/1
TABLET ORAL
Qty: 1 EACH | Refills: 0 | Status: SHIPPED | OUTPATIENT
Start: 2022-10-26 | End: 2022-10-31 | Stop reason: SDUPTHER

## 2022-10-26 RX ORDER — DOXYCYCLINE 100 MG/1
100 TABLET ORAL 2 TIMES DAILY
Qty: 20 TABLET | Refills: 0 | Status: SHIPPED | OUTPATIENT
Start: 2022-10-26 | End: 2022-11-05

## 2022-10-26 NOTE — PROGRESS NOTES
"Subjective   Ari Ngo is a 52 y.o. male.     History of Present Illness  Ari Ngo is in for his annual well visit. 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 He has knee and wrist pain in particular, and knees will swell at times.  There is no history of chest pain or dyspnea. There is no history of issue with bowel or bladder dysfunction. There is no history of dizziness or lightheadedness. There is no history of issue with sleep or mood. There is no history of issue with present medication.            /85 (BP Location: Right arm, Patient Position: Sitting, Cuff Size: Large Adult)   Pulse 67   Temp 98.2 °F (36.8 °C) (Temporal)   Resp 16   Ht 180.3 cm (71\")   Wt 106 kg (233 lb)   SpO2 99%   BMI 32.50 kg/m²       Chief Complaint   Patient presents with   • Annual Exam           Current Outpatient Medications:   •  atorvastatin (LIPITOR) 20 MG tablet, TAKE 1 TABLET BY MOUTH EVERYDAY AT BEDTIME, Disp: 90 tablet, Rfl: 1  •  baclofen (LIORESAL) 10 MG tablet, TAKE 1 TABLET BY MOUTH THREE TIMES A DAY (Patient taking differently: As needed), Disp: 60 tablet, Rfl: 1  •  fluticasone (FLONASE) 50 MCG/ACT nasal spray, INSTILL 2 SPRAYS INTO THE NOSTRIL(S) AS DIRECTED BY PROVIDER ONCE DAILY, Disp: 16 mL, Rfl: 3  •  methylPREDNISolone (MEDROL) 4 MG dose pack, Take as directed on package instructions., Disp: 1 each, Rfl: 0  •  Chlorcyclizine-Pseudoephed (Stahist AD) 25-60 MG tablet, Take 0.5 tablets by mouth 2 (Two) Times a Day As Needed (congestion, sinus pressure)., Disp: 30 tablet, Rfl: 0  •  doxycycline (ADOXA) 100 MG tablet, Take 1 tablet by mouth 2 (Two) Times a Day for 10 days., Disp: 20 tablet, Rfl: 0        The following portions of the patient's history were reviewed and updated as appropriate: allergies, current medications, past family history, past medical history, past social history, past " surgical history, and problem list.    Review of Systems   Constitutional: Negative for activity change, fatigue, fever and unexpected weight change.   HENT: Positive for congestion, postnasal drip and sinus pain. Negative for facial swelling, hearing loss, nosebleeds and rhinorrhea.    Eyes: Negative for visual disturbance.   Respiratory: Negative for chest tightness and shortness of breath.    Cardiovascular: Negative for chest pain.   Gastrointestinal: Negative for constipation and diarrhea.   Genitourinary: Negative for difficulty urinating and frequency.   Musculoskeletal: Positive for arthralgias. Negative for back pain and myalgias.   Neurological: Positive for headaches. Negative for dizziness and weakness.   Psychiatric/Behavioral: Negative for decreased concentration, dysphoric mood and sleep disturbance. The patient is not nervous/anxious.        Objective   Physical Exam  Vitals and nursing note reviewed.   Constitutional:       Appearance: Normal appearance.   HENT:      Right Ear: Tympanic membrane and ear canal normal.      Left Ear: Tympanic membrane and ear canal normal.      Nose: Congestion present.      Mouth/Throat:      Pharynx: Oropharynx is clear.   Eyes:      Conjunctiva/sclera: Conjunctivae normal.      Pupils: Pupils are equal, round, and reactive to light.   Cardiovascular:      Rate and Rhythm: Normal rate and regular rhythm.      Heart sounds: Normal heart sounds. No murmur heard.  Pulmonary:      Effort: Pulmonary effort is normal.      Breath sounds: No wheezing or rales.   Abdominal:      General: Bowel sounds are normal.      Palpations: Abdomen is soft.      Tenderness: There is no abdominal tenderness. There is no guarding.   Musculoskeletal:      Cervical back: Neck supple.      Right lower leg: No edema.      Left lower leg: No edema.   Lymphadenopathy:      Cervical: No cervical adenopathy.   Neurological:      General: No focal deficit present.      Mental Status: He is alert  and oriented to person, place, and time.   Psychiatric:         Mood and Affect: Mood normal.           Assessment & Plan   Problems Addressed this Visit        Cardiac and Vasculature    Mixed hyperlipidemia    Relevant Orders    Comprehensive metabolic panel    Lipid panel   Other Visit Diagnoses     Routine general medical examination at a health care facility    -  Primary    Encounter for screening for malignant neoplasm of prostate        Relevant Orders    PSA SCREENING    Acute left-sided low back pain with bilateral sciatica        Relevant Medications    methylPREDNISolone (MEDROL) 4 MG dose pack      Diagnoses       Codes Comments    Routine general medical examination at a health care facility    -  Primary ICD-10-CM: Z00.00  ICD-9-CM: V70.0     Mixed hyperlipidemia     ICD-10-CM: E78.2  ICD-9-CM: 272.2     Encounter for screening for malignant neoplasm of prostate     ICD-10-CM: Z12.5  ICD-9-CM: V76.44     Acute left-sided low back pain with bilateral sciatica     ICD-10-CM: M54.42, M54.41  ICD-9-CM: 724.2, 724.3           He has a little bit of chronic sinus buildup so I will treat that today, and he reasonably responds to prednisone, and that will also help with some aches and 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

## 2022-10-29 ENCOUNTER — PATIENT MESSAGE (OUTPATIENT)
Dept: FAMILY MEDICINE CLINIC | Facility: CLINIC | Age: 52
End: 2022-10-29

## 2022-10-29 NOTE — TELEPHONE ENCOUNTER
Can you check  I used the Z29.9 code we normally use but he says he was told we used the wrong code  Thanks   Ajay

## 2022-10-31 DIAGNOSIS — M54.42 ACUTE LEFT-SIDED LOW BACK PAIN WITH BILATERAL SCIATICA: ICD-10-CM

## 2022-10-31 DIAGNOSIS — M54.41 ACUTE LEFT-SIDED LOW BACK PAIN WITH BILATERAL SCIATICA: ICD-10-CM

## 2022-11-01 RX ORDER — METHYLPREDNISOLONE 4 MG/1
TABLET ORAL
Qty: 1 EACH | Refills: 0 | Status: SHIPPED | OUTPATIENT
Start: 2022-11-01 | End: 2023-01-24

## 2023-01-01 RX ORDER — ATORVASTATIN CALCIUM 20 MG/1
TABLET, FILM COATED ORAL
Qty: 90 TABLET | Refills: 1 | Status: SHIPPED | OUTPATIENT
Start: 2023-01-01

## 2023-01-05 ENCOUNTER — PATIENT MESSAGE (OUTPATIENT)
Dept: FAMILY MEDICINE CLINIC | Facility: CLINIC | Age: 53
End: 2023-01-05

## 2023-01-24 ENCOUNTER — OFFICE VISIT (OUTPATIENT)
Dept: FAMILY MEDICINE CLINIC | Facility: CLINIC | Age: 53
End: 2023-01-24
Payer: OTHER GOVERNMENT

## 2023-01-24 VITALS
OXYGEN SATURATION: 97 % | SYSTOLIC BLOOD PRESSURE: 116 MMHG | WEIGHT: 231 LBS | BODY MASS INDEX: 32.22 KG/M2 | DIASTOLIC BLOOD PRESSURE: 80 MMHG | TEMPERATURE: 97.8 F | HEART RATE: 67 BPM

## 2023-01-24 DIAGNOSIS — M54.16 LUMBAR BACK PAIN WITH RADICULOPATHY AFFECTING LEFT LOWER EXTREMITY: Primary | ICD-10-CM

## 2023-01-24 PROCEDURE — 99213 OFFICE O/P EST LOW 20 MIN: CPT | Performed by: FAMILY MEDICINE

## 2023-01-24 RX ORDER — BACLOFEN 10 MG/1
10 TABLET ORAL 3 TIMES DAILY PRN
Qty: 60 TABLET | Refills: 2 | Status: SHIPPED | OUTPATIENT
Start: 2023-01-24

## 2023-01-24 RX ORDER — IBUPROFEN 800 MG/1
800 TABLET ORAL EVERY 6 HOURS PRN
Qty: 90 TABLET | Refills: 1 | Status: SHIPPED | OUTPATIENT
Start: 2023-01-24 | End: 2023-03-19

## 2023-01-24 NOTE — PROGRESS NOTES
Subjective   Ari Ngo is a 52 y.o. male.     History of Present Illness  Ari Ngo is in for some pain in the left hip and thigh area that began after he stopped working out recently.  He has had some back problems in the past but has not had anything like this previously.  If he opens up the hip angle considerably he can make the pain go away.  The pain is not caused by any particular movements or activities, coming randomly.  Pain seems to be primarily in the lateral hip and thigh on the left. There is no history of chest pain or dyspnea. There is no history of issue with bowel or bladder dysfunction. There is no history of dizziness or lightheadedness. There is no history of issue with sleep or mood. There is no history of issue with present medication.     Back Pain  This is a new problem. The current episode started 1 to 4 weeks ago. The problem occurs constantly. The problem has been waxing and waning since onset. The pain is present in the sacro-iliac. The quality of the pain is described as shooting and stabbing. The pain radiates to the left knee and left thigh. The pain is at a severity of 6/10. The pain is the same all the time. The symptoms are aggravated by sitting and standing. Stiffness is present all day. Associated symptoms include leg pain and pelvic pain. Pertinent negatives include no abdominal pain, bladder incontinence, bowel incontinence, chest pain, dysuria, fever, headaches, numbness, paresis, paresthesias, perianal numbness, tingling, weakness or weight loss.          /80 (BP Location: Left arm, Patient Position: Sitting, Cuff Size: Large Adult)   Pulse 67   Temp 97.8 °F (36.6 °C) (Temporal)   Wt 105 kg (231 lb)   SpO2 97%   BMI 32.22 kg/m²       Chief Complaint   Patient presents with   • Hip Pain     Left hip pain started 2 weeks ago - starts at the back area and moves to the front of his leg            Current Outpatient Medications:   •  atorvastatin (LIPITOR) 20  MG tablet, TAKE 1 TABLET BY MOUTH EVERYDAY AT BEDTIME, Disp: 90 tablet, Rfl: 1  •  baclofen (LIORESAL) 10 MG tablet, Take 1 tablet by mouth 3 (Three) Times a Day As Needed for Muscle Spasms., Disp: 60 tablet, Rfl: 2  •  fluticasone (FLONASE) 50 MCG/ACT nasal spray, INSTILL 2 SPRAYS INTO THE NOSTRIL(S) AS DIRECTED BY PROVIDER ONCE DAILY, Disp: 16 mL, Rfl: 3  •  Chlorcyclizine-Pseudoephed (Stahist AD) 25-60 MG tablet, Take 0.5 tablets by mouth 2 (Two) Times a Day As Needed (congestion, sinus pressure)., Disp: 30 tablet, Rfl: 0  •  ibuprofen (ADVIL,MOTRIN) 800 MG tablet, Take 1 tablet by mouth Every 6 (Six) Hours As Needed for Mild Pain., Disp: 90 tablet, Rfl: 1        The following portions of the patient's history were reviewed and updated as appropriate: allergies, current medications, past family history, past medical history, past social history, past surgical history, and problem list.    Review of Systems   Constitutional: Negative for fever and weight loss.   Cardiovascular: Negative for chest pain.   Gastrointestinal: Negative for abdominal pain and bowel incontinence.   Genitourinary: Positive for pelvic pain. Negative for bladder incontinence and dysuria.   Musculoskeletal: Positive for back pain.   Neurological: Negative for tingling, weakness, numbness, headaches and paresthesias.       Objective   Physical Exam  Vitals and nursing note reviewed.   Abdominal:      General: Abdomen is flat. Bowel sounds are normal.   Musculoskeletal:      Right lower leg: No edema.      Left lower leg: No edema.   Skin:     Findings: No rash.   Neurological:      General: No focal deficit present.      Mental Status: He is alert and oriented to person, place, and time.           Assessment & Plan   Problems Addressed this Visit    None  Visit Diagnoses     Lumbar back pain with radiculopathy affecting left lower extremity    -  Primary    Relevant Orders    MRI Lumbar Spine Without Contrast    Ambulatory Referral to  Physical Therapy Evaluate and treat      Diagnoses       Codes Comments    Lumbar back pain with radiculopathy affecting left lower extremity    -  Primary ICD-10-CM: M54.16  ICD-9-CM: 724.4           I suspect nerve entrapment and will send for physical therapy and will treat with anti-inflammatories and muscle relaxants  He has a stretch that he can do in the interim that will help  I have asked him to get an MRI, if we can get it approved, to make sure that there is not something discogenic going on  I will see him back or refer as indicated       Answers for HPI/ROS submitted by the patient on 1/17/2023  What is the primary reason for your visit?: Back Pain

## 2023-02-14 ENCOUNTER — PATIENT MESSAGE (OUTPATIENT)
Dept: FAMILY MEDICINE CLINIC | Facility: CLINIC | Age: 53
End: 2023-02-14

## 2023-02-15 NOTE — TELEPHONE ENCOUNTER
I spoke to pt and was going to try to get auth for priority but priority does not take  so pt is going to sty with [facility]. I gave him the number to scheduling so he could go ahead and call them to schedule.

## 2023-02-23 ENCOUNTER — HOSPITAL ENCOUNTER (OUTPATIENT)
Dept: MRI IMAGING | Facility: HOSPITAL | Age: 53
Discharge: HOME | End: 2023-02-23
Admitting: FAMILY MEDICINE
Payer: OTHER GOVERNMENT

## 2023-02-23 ENCOUNTER — PATIENT MESSAGE (OUTPATIENT)
Dept: FAMILY MEDICINE CLINIC | Facility: CLINIC | Age: 53
End: 2023-02-23

## 2023-02-23 DIAGNOSIS — M54.16 LUMBAR BACK PAIN WITH RADICULOPATHY AFFECTING LEFT LOWER EXTREMITY: ICD-10-CM

## 2023-02-23 PROCEDURE — 72148 MRI LUMBAR SPINE W/O DYE: CPT

## 2023-02-24 DIAGNOSIS — M54.16 LUMBAR BACK PAIN WITH RADICULOPATHY AFFECTING LEFT LOWER EXTREMITY: Primary | ICD-10-CM

## 2023-03-01 DIAGNOSIS — M54.16 LUMBAR BACK PAIN WITH RADICULOPATHY AFFECTING LEFT LOWER EXTREMITY: Primary | ICD-10-CM

## 2023-03-03 ENCOUNTER — PATIENT MESSAGE (OUTPATIENT)
Dept: FAMILY MEDICINE CLINIC | Facility: CLINIC | Age: 53
End: 2023-03-03

## 2023-03-19 RX ORDER — IBUPROFEN 800 MG/1
TABLET ORAL
Qty: 90 TABLET | Refills: 1 | Status: SHIPPED | OUTPATIENT
Start: 2023-03-19

## 2023-10-11 ENCOUNTER — TELEPHONE (OUTPATIENT)
Dept: FAMILY MEDICINE CLINIC | Facility: CLINIC | Age: 53
End: 2023-10-11
Payer: OTHER GOVERNMENT

## 2023-10-11 RX ORDER — PREDNISONE 10 MG/1
TABLET ORAL
Qty: 40 TABLET | Refills: 0 | Status: SHIPPED | OUTPATIENT
Start: 2023-10-11

## 2023-10-11 NOTE — TELEPHONE ENCOUNTER
"  Caller: Ari Ngo \"TESSA\"    Relationship: Self    Best call back number:     What medication are you requesting: STEROID     What are your current symptoms: POISON IVY, HANDS ARMS FOREARMS     How long have you been experiencing symptoms: 2 DAYS     Have you had these symptoms before:    [x] Yes  [] No    Have you been treated for these symptoms before:   [x] Yes  [] No    If a prescription is needed, what is your preferred pharmacy and phone number: [id number] - [place], [place] - [address] - [phone]  - [phone]     Additional notes:    PLEASE ADVISE   "

## 2023-10-30 ENCOUNTER — OFFICE VISIT (OUTPATIENT)
Dept: FAMILY MEDICINE CLINIC | Facility: CLINIC | Age: 53
End: 2023-10-30
Payer: OTHER GOVERNMENT

## 2023-10-30 ENCOUNTER — LAB (OUTPATIENT)
Dept: FAMILY MEDICINE CLINIC | Facility: CLINIC | Age: 53
End: 2023-10-30
Payer: OTHER GOVERNMENT

## 2023-10-30 ENCOUNTER — PATIENT MESSAGE (OUTPATIENT)
Dept: FAMILY MEDICINE CLINIC | Facility: CLINIC | Age: 53
End: 2023-10-30

## 2023-10-30 VITALS
TEMPERATURE: 98.2 F | DIASTOLIC BLOOD PRESSURE: 80 MMHG | HEART RATE: 65 BPM | BODY MASS INDEX: 32.94 KG/M2 | OXYGEN SATURATION: 100 % | WEIGHT: 236.2 LBS | SYSTOLIC BLOOD PRESSURE: 126 MMHG

## 2023-10-30 DIAGNOSIS — Z12.5 ENCOUNTER FOR SCREENING FOR MALIGNANT NEOPLASM OF PROSTATE: ICD-10-CM

## 2023-10-30 DIAGNOSIS — Z00.00 ROUTINE GENERAL MEDICAL EXAMINATION AT A HEALTH CARE FACILITY: Primary | ICD-10-CM

## 2023-10-30 LAB
ALBUMIN SERPL-MCNC: 4.3 G/DL (ref 3.5–5.2)
ALBUMIN/GLOB SERPL: 1.7 G/DL
ALP SERPL-CCNC: 93 U/L (ref 39–117)
ALT SERPL W P-5'-P-CCNC: 30 U/L (ref 1–41)
ANION GAP SERPL CALCULATED.3IONS-SCNC: 11.4 MMOL/L (ref 5–15)
AST SERPL-CCNC: 25 U/L (ref 1–40)
BILIRUB SERPL-MCNC: 0.7 MG/DL (ref 0–1.2)
BUN SERPL-MCNC: 13 MG/DL (ref 6–20)
BUN/CREAT SERPL: 11.3 (ref 7–25)
CALCIUM SPEC-SCNC: 9.7 MG/DL (ref 8.6–10.5)
CHLORIDE SERPL-SCNC: 103 MMOL/L (ref 98–107)
CHOLEST SERPL-MCNC: 208 MG/DL (ref 0–200)
CO2 SERPL-SCNC: 24.6 MMOL/L (ref 22–29)
CREAT SERPL-MCNC: 1.15 MG/DL (ref 0.76–1.27)
EGFRCR SERPLBLD CKD-EPI 2021: 75.6 ML/MIN/1.73
GLOBULIN UR ELPH-MCNC: 2.5 GM/DL
GLUCOSE SERPL-MCNC: 105 MG/DL (ref 65–99)
HDLC SERPL-MCNC: 43 MG/DL (ref 40–60)
LDLC SERPL CALC-MCNC: 146 MG/DL (ref 0–100)
LDLC/HDLC SERPL: 3.36 {RATIO}
POTASSIUM SERPL-SCNC: 4.3 MMOL/L (ref 3.5–5.2)
PROT SERPL-MCNC: 6.8 G/DL (ref 6–8.5)
PSA SERPL-MCNC: 7.93 NG/ML (ref 0–4)
SODIUM SERPL-SCNC: 139 MMOL/L (ref 136–145)
TRIGL SERPL-MCNC: 103 MG/DL (ref 0–150)
VLDLC SERPL-MCNC: 19 MG/DL (ref 5–40)

## 2023-10-30 PROCEDURE — 36415 COLL VENOUS BLD VENIPUNCTURE: CPT | Performed by: FAMILY MEDICINE

## 2023-10-30 PROCEDURE — G0103 PSA SCREENING: HCPCS | Performed by: FAMILY MEDICINE

## 2023-10-30 PROCEDURE — 80053 COMPREHEN METABOLIC PANEL: CPT | Performed by: FAMILY MEDICINE

## 2023-10-30 PROCEDURE — 99396 PREV VISIT EST AGE 40-64: CPT | Performed by: FAMILY MEDICINE

## 2023-10-30 PROCEDURE — 80061 LIPID PANEL: CPT | Performed by: FAMILY MEDICINE

## 2023-10-30 PROCEDURE — 84402 ASSAY OF FREE TESTOSTERONE: CPT | Performed by: FAMILY MEDICINE

## 2023-10-30 PROCEDURE — 84403 ASSAY OF TOTAL TESTOSTERONE: CPT | Performed by: FAMILY MEDICINE

## 2023-10-30 RX ORDER — BACLOFEN 10 MG/1
10 TABLET ORAL 3 TIMES DAILY PRN
Qty: 60 TABLET | Refills: 2 | Status: SHIPPED | OUTPATIENT
Start: 2023-10-30

## 2023-10-30 NOTE — PROGRESS NOTES
Subjective   Ari Ngo is a 53 y.o. male.     History of Present Illness  Ari Ngo is in his annual physical. He has some chronic low back issues that he manages with exercise/PT. He has been a little lax with diet and has gained some weight as a result  There is no history of chest pain or dyspnea. There is no history of issue with bowel or bladder dysfunction. There is no history of dizziness or lightheadedness. There is no history of issue with sleep or mood. There is no history of issue with present medication.              /80 (BP Location: Left arm, Patient Position: Sitting, Cuff Size: Large Adult)   Pulse 65   Temp 98.2 °F (36.8 °C) (Temporal)   Wt 107 kg (236 lb 3.2 oz)   SpO2 100%   BMI 32.94 kg/m²       Chief Complaint   Patient presents with    Annual Exam     Due for labs - testosterone, PSA, the works - fasting for labs - has been doing PT with [facility] and has done [facility] for his lower back            Current Outpatient Medications:     atorvastatin (LIPITOR) 20 MG tablet, TAKE 1 TABLET BY MOUTH EVERYDAY AT BEDTIME, Disp: 90 tablet, Rfl: 1    baclofen (LIORESAL) 10 MG tablet, Take 1 tablet by mouth 3 (Three) Times a Day As Needed for Muscle Spasms., Disp: 60 tablet, Rfl: 2    fluticasone (FLONASE) 50 MCG/ACT nasal spray, INSTILL 2 SPRAYS INTO THE NOSTRIL(S) AS DIRECTED BY PROVIDER ONCE DAILY, Disp: 16 mL, Rfl: 3    ibuprofen (ADVIL,MOTRIN) 800 MG tablet, TAKE 1 TABLET BY MOUTH EVERY 6 HOURS AS NEEDED FOR MILD PAIN, Disp: 90 tablet, Rfl: 1            The following portions of the patient's history were reviewed and updated as appropriate: allergies, current medications, past family history, past medical history, past social history, past surgical history, and problem list.    Review of Systems   Constitutional:  Negative for activity change, fatigue and fever.   HENT:  Negative for congestion, sinus pressure, sinus pain, sore throat and trouble swallowing.    Eyes:  Negative for  visual disturbance.   Respiratory:  Negative for chest tightness, shortness of breath and wheezing.    Cardiovascular:  Negative for chest pain.   Gastrointestinal:  Negative for abdominal distention, abdominal pain, constipation, diarrhea, nausea and vomiting.   Genitourinary:  Negative for difficulty urinating and dysuria.   Musculoskeletal:  Positive for back pain. Negative for neck pain.   Neurological:  Negative for weakness, numbness and headaches.   Psychiatric/Behavioral:  Negative for agitation, hallucinations and suicidal ideas.        Objective   Physical Exam  Vitals and nursing note reviewed.   Constitutional:       Appearance: Normal appearance.   HENT:      Right Ear: Tympanic membrane and ear canal normal.      Left Ear: Tympanic membrane and ear canal normal.      Nose: Nose normal.      Mouth/Throat:      Pharynx: Oropharynx is clear.   Eyes:      Conjunctiva/sclera: Conjunctivae normal.   Cardiovascular:      Rate and Rhythm: Normal rate and regular rhythm.      Heart sounds: Normal heart sounds.   Pulmonary:      Effort: Pulmonary effort is normal.      Breath sounds: No wheezing or rales.   Abdominal:      General: Bowel sounds are normal.      Palpations: Abdomen is soft.      Tenderness: There is no abdominal tenderness. There is no guarding.   Musculoskeletal:      Cervical back: Neck supple.      Right lower leg: No edema.      Left lower leg: No edema.   Lymphadenopathy:      Cervical: No cervical adenopathy.   Skin:     Findings: No lesion or rash.   Neurological:      General: No focal deficit present.      Mental Status: He is alert and oriented to person, place, and time.   Psychiatric:         Mood and Affect: Mood normal.           Assessment & Plan   Problems Addressed this Visit    None  Visit Diagnoses       Routine general medical examination at a health care facility    -  Primary    Relevant Orders    Comprehensive metabolic panel    Lipid panel    Testosterone (Free & Total),  LC / MS    Encounter for screening for malignant neoplasm of prostate        Relevant Orders    PSA SCREENING          Diagnoses         Codes Comments    Routine general medical examination at a health care facility    -  Primary ICD-10-CM: Z00.00  ICD-9-CM: V70.0     Encounter for screening for malignant neoplasm of prostate     ICD-10-CM: Z12.5  ICD-9-CM: V76.44             I have asked him to work a little harder on diet  I have ordered labs and we will follow-up on the results  His exercise routine looks good  I refilled his baclofen for the occasional back pain flareup  I suggested that he get the Novavax COVID-vaccine, but he will get vaccinated through the   I advised him to see me again in a year for routine checkup, sooner if needed

## 2023-10-31 ENCOUNTER — PATIENT MESSAGE (OUTPATIENT)
Dept: FAMILY MEDICINE CLINIC | Facility: CLINIC | Age: 53
End: 2023-10-31

## 2023-10-31 ENCOUNTER — TELEPHONE (OUTPATIENT)
Dept: FAMILY MEDICINE CLINIC | Facility: CLINIC | Age: 53
End: 2023-10-31

## 2023-10-31 DIAGNOSIS — R97.20 ELEVATED PSA: Primary | ICD-10-CM

## 2023-10-31 NOTE — TELEPHONE ENCOUNTER
"  Caller: Ari Ngo \"TESSA\"    Relationship: Self    Best call back number: [phone]        Who are you requesting to speak with (clinical staff, provider,  specific staff member):         What was the call regarding: PATIENT WANTED TO GO OVER SOME OF HIS LABS. PATIENT STATED THAT THEY CAME BACK KIND OF BAD. PATIENT TRIED MAKING AN APPOINTMENT TO DISCUSS A PLAN. THE EARLIEST APPOINTMENT WAS IN JANUARY 2024. WE DID ADD PATIENT TO THE WAIT LIST.    PATIENT WOULD LIKE A TELEPHONE CALL FROM  SOMETIME THIS WEEK IF POSSIBLE.    THANK YOU      "

## 2023-11-04 LAB
TESTOST FREE SERPL-MCNC: 6 PG/ML (ref 7.2–24)
TESTOST SERPL-MCNC: 677.8 NG/DL (ref 264–916)

## 2023-11-05 ENCOUNTER — PATIENT MESSAGE (OUTPATIENT)
Dept: FAMILY MEDICINE CLINIC | Facility: CLINIC | Age: 53
End: 2023-11-05

## 2023-11-05 DIAGNOSIS — R97.20 ELEVATED PSA: Primary | ICD-10-CM

## 2023-11-09 ENCOUNTER — PATIENT MESSAGE (OUTPATIENT)
Dept: FAMILY MEDICINE CLINIC | Facility: CLINIC | Age: 53
End: 2023-11-09

## 2024-01-08 ENCOUNTER — OFFICE VISIT (OUTPATIENT)
Dept: FAMILY MEDICINE CLINIC | Facility: CLINIC | Age: 54
End: 2024-01-08
Payer: OTHER GOVERNMENT

## 2024-01-08 VITALS
BODY MASS INDEX: 33.36 KG/M2 | DIASTOLIC BLOOD PRESSURE: 87 MMHG | HEART RATE: 70 BPM | TEMPERATURE: 98.2 F | SYSTOLIC BLOOD PRESSURE: 142 MMHG | OXYGEN SATURATION: 98 % | WEIGHT: 239.2 LBS

## 2024-01-08 DIAGNOSIS — F98.8 ATTENTION DEFICIT DISORDER, UNSPECIFIED HYPERACTIVITY PRESENCE: ICD-10-CM

## 2024-01-08 DIAGNOSIS — R97.20 ELEVATED PSA: Primary | ICD-10-CM

## 2024-01-08 DIAGNOSIS — M67.442 GANGLION CYST OF TENDON SHEATH OF LEFT HAND: ICD-10-CM

## 2024-01-08 PROCEDURE — 99213 OFFICE O/P EST LOW 20 MIN: CPT | Performed by: FAMILY MEDICINE

## 2024-01-08 RX ORDER — LISDEXAMFETAMINE DIMESYLATE CAPSULES 30 MG/1
30 CAPSULE ORAL EVERY MORNING
Qty: 30 CAPSULE | Refills: 0 | Status: SHIPPED | OUTPATIENT
Start: 2024-01-08

## 2024-01-08 NOTE — PROGRESS NOTES
Subjective   Ari Ngo is a 53 y.o. male.     History of Present Illness  Ari Ngo is in for a few issues.  He had a prostate biopsy since her last visit because of a highly abnormal PSA, but the biopsy was negative.  He has not noticed any urinary difficulties since.  In fact, he has given up diet soda and feels much better.  He has a knot in the palm of his left hand.  It is not painful and it is not affecting his ability to use the hand.  Finally, he has started taking college classes again, as part of career advancement, and he has noticed significant difficulty functioning.  He is having difficulty focusing.  His son is on medication for ADD and he feels like he probably should have been as a youth.  There is no history of chest pain or dyspnea. There is no history of issue with bowel dysfunction. There is no history of dizziness or lightheadedness. There is no history of issue with sleep or mood. There is no history of issue with present medication.       Hand Pain   Pertinent negatives include no chest pain or numbness.   ADD  Pertinent negatives include no abdominal pain, chest pain, congestion, fatigue, fever, headaches, nausea, neck pain, numbness, sore throat, vomiting or weakness.          /87 (BP Location: Left arm, Patient Position: Sitting, Cuff Size: Large Adult)   Pulse 70   Temp 98.2 °F (36.8 °C) (Temporal)   Wt 109 kg (239 lb 3.2 oz)   SpO2 98%   BMI 33.36 kg/m²       Chief Complaint   Patient presents with    PSA results    Hand Pain     Left hand got a bump on the palm and sometimes wrist aches     ADD     Son had add/adhd and he was reading the test question and he answered yes to a lot and he was wondering if he could get on something            Current Outpatient Medications:     atorvastatin (LIPITOR) 20 MG tablet, TAKE 1 TABLET BY MOUTH EVERYDAY AT BEDTIME, Disp: 90 tablet, Rfl: 1    baclofen (LIORESAL) 10 MG tablet, Take 1 tablet by mouth 3 (Three) Times a Day As  Needed for Muscle Spasms., Disp: 60 tablet, Rfl: 2    ibuprofen (ADVIL,MOTRIN) 800 MG tablet, TAKE 1 TABLET BY MOUTH EVERY 6 HOURS AS NEEDED FOR MILD PAIN, Disp: 90 tablet, Rfl: 1    lisdexamfetamine (Vyvanse) 30 MG capsule, Take 1 capsule by mouth Every Morning, Disp: 30 capsule, Rfl: 0            The following portions of the patient's history were reviewed and updated as appropriate: allergies, current medications, past family history, past medical history, past social history, past surgical history, and problem list.    Review of Systems   Constitutional:  Negative for activity change, fatigue and fever.   HENT:  Negative for congestion, sinus pressure, sinus pain, sore throat and trouble swallowing.    Eyes:  Negative for visual disturbance.   Respiratory:  Negative for chest tightness, shortness of breath and wheezing.    Cardiovascular:  Negative for chest pain.   Gastrointestinal:  Negative for abdominal distention, abdominal pain, constipation, diarrhea, nausea and vomiting.   Genitourinary:  Negative for difficulty urinating and dysuria.   Musculoskeletal:  Negative for back pain and neck pain.   Neurological:  Negative for weakness, numbness and headaches.   Psychiatric/Behavioral:  Positive for decreased concentration. Negative for agitation, hallucinations and suicidal ideas.        Objective   Physical Exam  Vitals and nursing note reviewed.   HENT:      Right Ear: Tympanic membrane and ear canal normal.      Left Ear: Tympanic membrane and ear canal normal.   Cardiovascular:      Rate and Rhythm: Normal rate and regular rhythm.      Heart sounds: Normal heart sounds. No murmur heard.  Pulmonary:      Effort: Pulmonary effort is normal.      Breath sounds: No wheezing or rales.   Abdominal:      General: Bowel sounds are normal.      Palpations: Abdomen is soft.      Tenderness: There is no abdominal tenderness. There is no guarding.   Musculoskeletal:      Cervical back: Neck supple.      Right lower  leg: No edema.      Left lower leg: No edema.      Comments: Ganglion cyst left hand, small   Lymphadenopathy:      Cervical: No cervical adenopathy.   Neurological:      General: No focal deficit present.      Mental Status: He is alert and oriented to person, place, and time.   Psychiatric:         Mood and Affect: Mood normal.           Assessment & Plan   Problems Addressed this Visit    None  Visit Diagnoses       Elevated PSA    -  Primary    Attention deficit disorder, unspecified hyperactivity presence        Relevant Medications    lisdexamfetamine (Vyvanse) 30 MG capsule    Ganglion cyst of tendon sheath of left hand              Diagnoses         Codes Comments    Elevated PSA    -  Primary ICD-10-CM: R97.20  ICD-9-CM: 790.93     Attention deficit disorder, unspecified hyperactivity presence     ICD-10-CM: F98.8  ICD-9-CM: 314.00     Ganglion cyst of tendon sheath of left hand     ICD-10-CM: M67.442  ICD-9-CM: 727.42             I will have him monitor the cyst but for now it needs no treatment  He has a follow-up scheduled with urology for the PSA  I will trial him on some Vyvanse and he is to give me some feedback through [employer] and come back and see me in a couple months for follow-up  I did ask him to call sooner with any concerns, especially if it affects sleep or causes palpitations       Answers submitted by the patient for this visit:  Primary Reason for Visit (Submitted on 1/1/2024)  What is the primary reason for your visit?: Other  Other (Submitted on 1/1/2024)  Please describe your symptoms.: Cognitive issue, Tendon in left hand and follow up on PSA.  Have you had these symptoms before?: Yes  How long have you been having these symptoms?: Greater than 2 weeks  Please list any medications you are currently taking for this condition.: Ibuprofen, Lipitor  Please describe any probable cause for these symptoms. : Not sure

## 2024-01-09 ENCOUNTER — TELEPHONE (OUTPATIENT)
Dept: FAMILY MEDICINE CLINIC | Facility: CLINIC | Age: 54
End: 2024-01-09
Payer: OTHER GOVERNMENT

## 2024-01-09 DIAGNOSIS — F98.8 ATTENTION DEFICIT DISORDER, UNSPECIFIED HYPERACTIVITY PRESENCE: Primary | ICD-10-CM

## 2024-01-09 NOTE — TELEPHONE ENCOUNTER
Lisdexamfetamine Dimesylate 30MG capsules PA submitted to covermymeds. Waiting on determination.   Key: [id number]  PA Case ID #: [id number]  Rx #: [id number]

## 2024-01-10 RX ORDER — DEXTROAMPHETAMINE SACCHARATE, AMPHETAMINE ASPARTATE MONOHYDRATE, DEXTROAMPHETAMINE SULFATE AND AMPHETAMINE SULFATE 3.75; 3.75; 3.75; 3.75 MG/1; MG/1; MG/1; MG/1
15 CAPSULE, EXTENDED RELEASE ORAL EVERY MORNING
Qty: 30 CAPSULE | Refills: 0 | Status: SHIPPED | OUTPATIENT
Start: 2024-01-10

## 2024-01-10 NOTE — TELEPHONE ENCOUNTER
Denied.   Coverage is provided in situations where the patient has tried and failed mixed amphetamine  salts ER (Adderall XR, generics) or another long acting amphetamine or amphetamine derivative  type drug. Coverage cannot be authorized at this time. Other coverage conditions apply.  Please advise.

## 2024-02-15 RX ORDER — BACLOFEN 10 MG/1
10 TABLET ORAL 3 TIMES DAILY PRN
Qty: 60 TABLET | Refills: 2 | Status: SHIPPED | OUTPATIENT
Start: 2024-02-15

## 2024-02-18 DIAGNOSIS — F98.8 ATTENTION DEFICIT DISORDER, UNSPECIFIED HYPERACTIVITY PRESENCE: ICD-10-CM

## 2024-02-18 RX ORDER — IBUPROFEN 800 MG/1
800 TABLET ORAL EVERY 6 HOURS PRN
Qty: 90 TABLET | Refills: 1 | Status: SHIPPED | OUTPATIENT
Start: 2024-02-18

## 2024-02-18 RX ORDER — DEXTROAMPHETAMINE SACCHARATE, AMPHETAMINE ASPARTATE MONOHYDRATE, DEXTROAMPHETAMINE SULFATE AND AMPHETAMINE SULFATE 3.75; 3.75; 3.75; 3.75 MG/1; MG/1; MG/1; MG/1
15 CAPSULE, EXTENDED RELEASE ORAL EVERY MORNING
Qty: 30 CAPSULE | Refills: 0 | Status: SHIPPED | OUTPATIENT
Start: 2024-02-18

## 2024-03-03 RX ORDER — ATORVASTATIN CALCIUM 20 MG/1
TABLET, FILM COATED ORAL
Qty: 90 TABLET | Refills: 1 | Status: SHIPPED | OUTPATIENT
Start: 2024-03-03

## 2024-03-06 ENCOUNTER — OFFICE VISIT (OUTPATIENT)
Dept: FAMILY MEDICINE CLINIC | Facility: CLINIC | Age: 54
End: 2024-03-06
Payer: OTHER GOVERNMENT

## 2024-03-06 VITALS
SYSTOLIC BLOOD PRESSURE: 116 MMHG | BODY MASS INDEX: 32.92 KG/M2 | WEIGHT: 236 LBS | DIASTOLIC BLOOD PRESSURE: 81 MMHG | OXYGEN SATURATION: 98 % | HEART RATE: 71 BPM | TEMPERATURE: 98.3 F

## 2024-03-06 DIAGNOSIS — F98.8 ATTENTION DEFICIT DISORDER, UNSPECIFIED HYPERACTIVITY PRESENCE: Primary | ICD-10-CM

## 2024-03-06 PROCEDURE — 99213 OFFICE O/P EST LOW 20 MIN: CPT | Performed by: FAMILY MEDICINE

## 2024-03-06 NOTE — PROGRESS NOTES
Subjective   Ari Ngo is a 53 y.o. male.     History of Present Illness  Ari Ngo is in for follow up on his last visit.  We started him on some Vyvanse but had to change to Adderall XR for insurance purposes.  He is doing well with the coursework he is having to take.  He has not had any issues with palpitations or chest tightness on the medication. There is no history of chest pain or dyspnea. There is no history of issue with bowel or bladder dysfunction. There is no history of dizziness or lightheadedness. There is no history of issue with sleep or mood. There is no history of issue with present medication.              /81 (BP Location: Left arm, Patient Position: Sitting, Cuff Size: Large Adult)   Pulse 71   Temp 98.3 °F (36.8 °C) (Temporal)   Wt 107 kg (236 lb)   SpO2 98%   BMI 32.92 kg/m²       Chief Complaint   Patient presents with    Medicine Check     2 month check     feet getting blisters           Current Outpatient Medications:     amphetamine-dextroamphetamine XR (Adderall XR) 15 MG 24 hr capsule, Take 1 capsule by mouth Every Morning, Disp: 30 capsule, Rfl: 0    atorvastatin (LIPITOR) 20 MG tablet, TAKE 1 TABLET BY MOUTH EVERYDAY AT BEDTIME, Disp: 90 tablet, Rfl: 1    baclofen (LIORESAL) 10 MG tablet, TAKE 1 TABLET BY MOUTH 3 TIMES A DAY AS NEEDED FOR MUSCLE SPASMS., Disp: 60 tablet, Rfl: 2    ibuprofen (ADVIL,MOTRIN) 800 MG tablet, Take 1 tablet by mouth Every 6 (Six) Hours As Needed for Mild Pain., Disp: 90 tablet, Rfl: 1    lisdexamfetamine (Vyvanse) 30 MG capsule, Take 1 capsule by mouth Every Morning, Disp: 30 capsule, Rfl: 0    BMI is >= 30 and <35. (Class 1 Obesity). The following options were offered after discussion;: exercise counseling/recommendations and nutrition counseling/recommendations       The following portions of the patient's history were reviewed and updated as appropriate: allergies, current medications, past family history, past medical history, past  social history, past surgical history, and problem list.    Review of Systems   Unable to perform ROS: Acuity of condition       Objective   Physical Exam  Vitals and nursing note reviewed.   Constitutional:       Appearance: Normal appearance.   Cardiovascular:      Rate and Rhythm: Normal rate and regular rhythm.      Heart sounds: Normal heart sounds.   Pulmonary:      Effort: Pulmonary effort is normal.      Breath sounds: No wheezing or rales.   Musculoskeletal:      Cervical back: Neck supple.   Lymphadenopathy:      Cervical: No cervical adenopathy.   Neurological:      General: No focal deficit present.      Mental Status: He is alert and oriented to person, place, and time.   Psychiatric:         Mood and Affect: Mood normal.           Assessment & Plan   Problems Addressed this Visit    None  Visit Diagnoses       Attention deficit disorder, unspecified hyperactivity presence    -  Primary          Diagnoses         Codes Comments    Attention deficit disorder, unspecified hyperactivity presence    -  Primary ICD-10-CM: F98.8  ICD-9-CM: 314.00           I will maintain the current medication plan as it appears to be working  I have asked him to see me again in October as planned for his well visit  I have asked him to let me know if he has new concerns or worries in the interim         Answers submitted by the patient for this visit:  Primary Reason for Visit (Submitted on 3/5/2024)  What is the primary reason for your visit?: Other  Other (Submitted on 3/5/2024)  Please describe your symptoms.: Follow up for adderal  Have you had these symptoms before?: No  How long have you been having these symptoms?: 1-4 days  Please list any medications you are currently taking for this condition.: Na  Please describe any probable cause for these symptoms. : Na

## 2024-03-07 ENCOUNTER — PATIENT MESSAGE (OUTPATIENT)
Dept: FAMILY MEDICINE CLINIC | Facility: CLINIC | Age: 54
End: 2024-03-07

## 2024-04-06 DIAGNOSIS — F98.8 ATTENTION DEFICIT DISORDER, UNSPECIFIED HYPERACTIVITY PRESENCE: ICD-10-CM

## 2024-04-07 RX ORDER — DEXTROAMPHETAMINE SACCHARATE, AMPHETAMINE ASPARTATE MONOHYDRATE, DEXTROAMPHETAMINE SULFATE AND AMPHETAMINE SULFATE 3.75; 3.75; 3.75; 3.75 MG/1; MG/1; MG/1; MG/1
15 CAPSULE, EXTENDED RELEASE ORAL EVERY MORNING
Qty: 30 CAPSULE | Refills: 0 | Status: SHIPPED | OUTPATIENT
Start: 2024-04-07

## 2024-04-11 RX ORDER — IBUPROFEN 800 MG/1
800 TABLET ORAL EVERY 6 HOURS PRN
Qty: 90 TABLET | Refills: 1 | Status: SHIPPED | OUTPATIENT
Start: 2024-04-11

## 2024-05-10 DIAGNOSIS — F98.8 ATTENTION DEFICIT DISORDER, UNSPECIFIED HYPERACTIVITY PRESENCE: ICD-10-CM

## 2024-05-10 RX ORDER — DEXTROAMPHETAMINE SACCHARATE, AMPHETAMINE ASPARTATE MONOHYDRATE, DEXTROAMPHETAMINE SULFATE AND AMPHETAMINE SULFATE 3.75; 3.75; 3.75; 3.75 MG/1; MG/1; MG/1; MG/1
15 CAPSULE, EXTENDED RELEASE ORAL EVERY MORNING
Qty: 30 CAPSULE | Refills: 0 | Status: SHIPPED | OUTPATIENT
Start: 2024-05-10

## 2024-06-10 DIAGNOSIS — F98.8 ATTENTION DEFICIT DISORDER, UNSPECIFIED HYPERACTIVITY PRESENCE: ICD-10-CM

## 2024-06-10 RX ORDER — DEXTROAMPHETAMINE SACCHARATE, AMPHETAMINE ASPARTATE MONOHYDRATE, DEXTROAMPHETAMINE SULFATE AND AMPHETAMINE SULFATE 3.75; 3.75; 3.75; 3.75 MG/1; MG/1; MG/1; MG/1
15 CAPSULE, EXTENDED RELEASE ORAL EVERY MORNING
Qty: 30 CAPSULE | Refills: 0 | Status: SHIPPED | OUTPATIENT
Start: 2024-06-10

## 2024-06-11 ENCOUNTER — OFFICE VISIT (OUTPATIENT)
Dept: FAMILY MEDICINE CLINIC | Facility: CLINIC | Age: 54
End: 2024-06-11
Payer: OTHER GOVERNMENT

## 2024-06-11 VITALS
TEMPERATURE: 98.4 F | DIASTOLIC BLOOD PRESSURE: 82 MMHG | OXYGEN SATURATION: 95 % | HEIGHT: 71 IN | WEIGHT: 231.6 LBS | HEART RATE: 66 BPM | SYSTOLIC BLOOD PRESSURE: 121 MMHG | BODY MASS INDEX: 32.42 KG/M2

## 2024-06-11 DIAGNOSIS — J30.1 SEASONAL ALLERGIC RHINITIS DUE TO POLLEN: ICD-10-CM

## 2024-06-11 DIAGNOSIS — K64.4 EXTERNAL HEMORRHOID, BLEEDING: ICD-10-CM

## 2024-06-11 PROCEDURE — 99213 OFFICE O/P EST LOW 20 MIN: CPT | Performed by: FAMILY MEDICINE

## 2024-06-11 RX ORDER — CHLORCYCLIZINE HYDROCHLORIDE AND PSEUDOEPHEDRINE HYDROCHLORIDE 25; 60 MG/1; MG/1
0.5 TABLET ORAL 3 TIMES DAILY PRN
Qty: 42 TABLET | Refills: 1 | Status: SHIPPED | OUTPATIENT
Start: 2024-06-11

## 2024-06-11 RX ORDER — PREDNISONE 10 MG/1
TABLET ORAL
Qty: 40 TABLET | Refills: 0 | Status: SHIPPED | OUTPATIENT
Start: 2024-06-11

## 2024-06-11 NOTE — PROGRESS NOTES
"Subjective   Ari Ngo is a 53 y.o. male.     History of Present Illness  Ari Ngo is in for concerns. He has been having irritation to his skin, along with drainage and throat irritation. He has not had any fever. He has not had any significant rash, just a few bumps on his right arm from picking up some wood. He has not had any cough. He has not had any fatigue of note.  There is no history of chest pain or dyspnea. There is no history of issue with bowel or bladder dysfunction, though he does have some rectal irritation and has had some episodes of bright red blood. There is no history of dizziness or lightheadedness. There is no history of issue with sleep or mood. There is no history of issue with present medication.       Rash  This is a new problem. The current episode started 1 to 4 weeks ago. The problem has been coming and going since onset. The affected locations include the lips, neck and torso. The rash is characterized by redness and itchiness. He was exposed to nothing. Associated symptoms include a sore throat. Pertinent negatives include no anorexia, congestion, cough, diarrhea, facial edema, fatigue, fever, joint pain, nail changes, rhinorrhea, shortness of breath or vomiting.          /82 (BP Location: Left arm, Patient Position: Sitting, Cuff Size: Large Adult)   Pulse 66   Temp 98.4 °F (36.9 °C) (Temporal)   Ht 180.3 cm (70.98\")   Wt 105 kg (231 lb 9.6 oz)   SpO2 95%   BMI 32.32 kg/m²       Chief Complaint   Patient presents with    Rash     Torso, lips, neck, red itchy comes and goes - prostate/hemorrhoids            Current Outpatient Medications:     amphetamine-dextroamphetamine XR (Adderall XR) 15 MG 24 hr capsule, Take 1 capsule by mouth Every Morning, Disp: 30 capsule, Rfl: 0    atorvastatin (LIPITOR) 20 MG tablet, TAKE 1 TABLET BY MOUTH EVERYDAY AT BEDTIME, Disp: 90 tablet, Rfl: 1    baclofen (LIORESAL) 10 MG tablet, TAKE 1 TABLET BY MOUTH 3 TIMES A DAY AS NEEDED " FOR MUSCLE SPASMS., Disp: 60 tablet, Rfl: 2    ibuprofen (ADVIL,MOTRIN) 800 MG tablet, TAKE 1 TABLET BY MOUTH EVERY 6 HOURS AS NEEDED FOR MILD PAIN, Disp: 90 tablet, Rfl: 1    lisdexamfetamine (Vyvanse) 30 MG capsule, Take 1 capsule by mouth Every Morning, Disp: 30 capsule, Rfl: 0    Chlorcyclizine-Pseudoephed (Stahist AD) 25-60 MG tablet, Take 0.5 tablets by mouth 3 (Three) Times a Day As Needed (allergies, congestion)., Disp: 42 tablet, Rfl: 1    predniSONE (DELTASONE) 10 MG tablet, Take 4 tabs po daily x 4 d, then 3 tabs po daily x 4 d , then 2 tabs po daily x 4 d, then 1 tab po daily x 4 d, Disp: 40 tablet, Rfl: 0            The following portions of the patient's history were reviewed and updated as appropriate: allergies, current medications, past family history, past medical history, past social history, past surgical history, and problem list.    Review of Systems   Constitutional:  Negative for activity change, fatigue and fever.   HENT:  Positive for sore throat. Negative for congestion, rhinorrhea, sinus pressure, sinus pain and trouble swallowing.    Eyes:  Negative for visual disturbance.   Respiratory:  Negative for cough, chest tightness, shortness of breath and wheezing.    Cardiovascular:  Negative for chest pain.   Gastrointestinal:  Positive for anal bleeding and rectal pain. Negative for abdominal distention, abdominal pain, anorexia, constipation, diarrhea, nausea and vomiting.   Genitourinary:  Negative for difficulty urinating and dysuria.   Musculoskeletal:  Negative for back pain, joint pain and neck pain.   Skin:  Positive for rash. Negative for nail changes.   Psychiatric/Behavioral:  Negative for agitation, hallucinations and suicidal ideas.        Objective   Physical Exam  Vitals and nursing note reviewed.   Constitutional:       Appearance: Normal appearance.   HENT:      Right Ear: Tympanic membrane and ear canal normal.      Left Ear: Tympanic membrane and ear canal normal.       Nose: Congestion and rhinorrhea present.      Mouth/Throat:      Pharynx: Oropharynx is clear.   Eyes:      Conjunctiva/sclera: Conjunctivae normal.      Pupils: Pupils are equal, round, and reactive to light.   Genitourinary:     Comments: External hemorrhoid but no active bleeding and it does not appear thrombosed  Skin:     Findings: Rash (Mild bumpy rash on the right forearm) present.   Neurological:      General: No focal deficit present.      Mental Status: He is alert and oriented to person, place, and time.   Psychiatric:         Mood and Affect: Mood normal.           Assessment & Plan   Problems Addressed this Visit    None  Visit Diagnoses       Seasonal allergic rhinitis due to pollen        Relevant Medications    predniSONE (DELTASONE) 10 MG tablet    External hemorrhoid, bleeding              Diagnoses         Codes Comments    Seasonal allergic rhinitis due to pollen     ICD-10-CM: J30.1  ICD-9-CM: 477.0     External hemorrhoid, bleeding     ICD-10-CM: K64.4  ICD-9-CM: 455.5           I will send out some steroids and Stahist for this allergy problem  I have asked him to let me know if the hemorrhoid issue flares but over-the-counter products are acceptable for now  I could refer if needed but for now he is stable         Answers submitted by the patient for this visit:  Primary Reason for Visit (Submitted on 6/10/2024)  What is the primary reason for your visit?: Rash

## 2024-07-24 DIAGNOSIS — F98.8 ATTENTION DEFICIT DISORDER, UNSPECIFIED HYPERACTIVITY PRESENCE: ICD-10-CM

## 2024-07-24 RX ORDER — DEXTROAMPHETAMINE SACCHARATE, AMPHETAMINE ASPARTATE MONOHYDRATE, DEXTROAMPHETAMINE SULFATE AND AMPHETAMINE SULFATE 3.75; 3.75; 3.75; 3.75 MG/1; MG/1; MG/1; MG/1
15 CAPSULE, EXTENDED RELEASE ORAL EVERY MORNING
Qty: 30 CAPSULE | Refills: 0 | Status: SHIPPED | OUTPATIENT
Start: 2024-07-24

## 2024-08-16 RX ORDER — BACLOFEN 10 MG/1
10 TABLET ORAL 3 TIMES DAILY PRN
Qty: 60 TABLET | Refills: 2 | Status: SHIPPED | OUTPATIENT
Start: 2024-08-16

## 2024-08-29 DIAGNOSIS — F98.8 ATTENTION DEFICIT DISORDER, UNSPECIFIED HYPERACTIVITY PRESENCE: ICD-10-CM

## 2024-08-29 RX ORDER — DEXTROAMPHETAMINE SACCHARATE, AMPHETAMINE ASPARTATE MONOHYDRATE, DEXTROAMPHETAMINE SULFATE AND AMPHETAMINE SULFATE 3.75; 3.75; 3.75; 3.75 MG/1; MG/1; MG/1; MG/1
15 CAPSULE, EXTENDED RELEASE ORAL EVERY MORNING
Qty: 30 CAPSULE | Refills: 0 | Status: SHIPPED | OUTPATIENT
Start: 2024-08-29

## 2024-09-01 RX ORDER — ATORVASTATIN CALCIUM 20 MG/1
TABLET, FILM COATED ORAL
Qty: 90 TABLET | Refills: 1 | Status: SHIPPED | OUTPATIENT
Start: 2024-09-01

## 2024-09-04 ENCOUNTER — PATIENT MESSAGE (OUTPATIENT)
Dept: FAMILY MEDICINE CLINIC | Facility: CLINIC | Age: 54
End: 2024-09-04

## 2024-09-07 ENCOUNTER — PATIENT MESSAGE (OUTPATIENT)
Dept: FAMILY MEDICINE CLINIC | Facility: CLINIC | Age: 54
End: 2024-09-07

## 2024-09-07 DIAGNOSIS — F98.8 ATTENTION DEFICIT DISORDER, UNSPECIFIED HYPERACTIVITY PRESENCE: ICD-10-CM

## 2024-09-07 RX ORDER — LISDEXAMFETAMINE DIMESYLATE 30 MG/1
30 CAPSULE ORAL EVERY MORNING
Qty: 30 CAPSULE | Refills: 0 | Status: SHIPPED | OUTPATIENT
Start: 2024-09-07 | End: 2024-09-09

## 2024-09-09 DIAGNOSIS — F98.8 ATTENTION DEFICIT DISORDER, UNSPECIFIED HYPERACTIVITY PRESENCE: Primary | ICD-10-CM

## 2024-09-09 RX ORDER — DEXTROAMPHETAMINE SACCHARATE, AMPHETAMINE ASPARTATE, DEXTROAMPHETAMINE SULFATE AND AMPHETAMINE SULFATE 2.5; 2.5; 2.5; 2.5 MG/1; MG/1; MG/1; MG/1
10 TABLET ORAL 2 TIMES DAILY
Qty: 60 TABLET | Refills: 0 | Status: SHIPPED | OUTPATIENT
Start: 2024-09-09

## 2024-10-08 DIAGNOSIS — F98.8 ATTENTION DEFICIT DISORDER, UNSPECIFIED HYPERACTIVITY PRESENCE: ICD-10-CM

## 2024-10-08 DIAGNOSIS — F98.8 ATTENTION DEFICIT DISORDER, UNSPECIFIED TYPE: Primary | ICD-10-CM

## 2024-10-08 DIAGNOSIS — F90.9 ATTENTION DEFICIT HYPERACTIVITY DISORDER (ADHD), UNSPECIFIED ADHD TYPE: ICD-10-CM

## 2024-10-08 RX ORDER — DEXTROAMPHETAMINE SACCHARATE, AMPHETAMINE ASPARTATE, DEXTROAMPHETAMINE SULFATE AND AMPHETAMINE SULFATE 2.5; 2.5; 2.5; 2.5 MG/1; MG/1; MG/1; MG/1
10 TABLET ORAL 2 TIMES DAILY
Qty: 60 TABLET | Refills: 0 | Status: CANCELLED | OUTPATIENT
Start: 2024-10-08

## 2024-10-08 RX ORDER — DEXTROAMPHETAMINE SACCHARATE, AMPHETAMINE ASPARTATE, DEXTROAMPHETAMINE SULFATE AND AMPHETAMINE SULFATE 2.5; 2.5; 2.5; 2.5 MG/1; MG/1; MG/1; MG/1
10 TABLET ORAL 2 TIMES DAILY
Qty: 60 TABLET | Refills: 0 | Status: SHIPPED | OUTPATIENT
Start: 2024-10-08

## 2024-10-13 ENCOUNTER — PATIENT MESSAGE (OUTPATIENT)
Dept: FAMILY MEDICINE CLINIC | Facility: CLINIC | Age: 54
End: 2024-10-13

## 2024-10-31 ENCOUNTER — OFFICE VISIT (OUTPATIENT)
Dept: FAMILY MEDICINE CLINIC | Facility: CLINIC | Age: 54
End: 2024-10-31
Payer: OTHER GOVERNMENT

## 2024-10-31 ENCOUNTER — LAB (OUTPATIENT)
Dept: FAMILY MEDICINE CLINIC | Facility: CLINIC | Age: 54
End: 2024-10-31
Payer: OTHER GOVERNMENT

## 2024-10-31 VITALS
TEMPERATURE: 98.4 F | HEIGHT: 71 IN | WEIGHT: 237 LBS | BODY MASS INDEX: 33.18 KG/M2 | HEART RATE: 65 BPM | OXYGEN SATURATION: 98 % | DIASTOLIC BLOOD PRESSURE: 84 MMHG | SYSTOLIC BLOOD PRESSURE: 120 MMHG

## 2024-10-31 DIAGNOSIS — Z00.00 ENCOUNTER FOR WELL ADULT EXAM WITHOUT ABNORMAL FINDINGS: Primary | ICD-10-CM

## 2024-10-31 DIAGNOSIS — Z12.5 ENCOUNTER FOR SPECIAL SCREENING EXAMINATION FOR NEOPLASM OF PROSTATE: ICD-10-CM

## 2024-10-31 LAB
ALBUMIN SERPL-MCNC: 4.2 G/DL (ref 3.5–5.2)
ALBUMIN/GLOB SERPL: 1.6 G/DL
ALP SERPL-CCNC: 92 U/L (ref 39–117)
ALT SERPL W P-5'-P-CCNC: 35 U/L (ref 1–41)
ANION GAP SERPL CALCULATED.3IONS-SCNC: 9.3 MMOL/L (ref 5–15)
AST SERPL-CCNC: 28 U/L (ref 1–40)
BASOPHILS # BLD AUTO: 0.06 10*3/MM3 (ref 0–0.2)
BASOPHILS NFR BLD AUTO: 1.2 % (ref 0–1.5)
BILIRUB SERPL-MCNC: 0.6 MG/DL (ref 0–1.2)
BUN SERPL-MCNC: 13 MG/DL (ref 6–20)
BUN/CREAT SERPL: 9.9 (ref 7–25)
CALCIUM SPEC-SCNC: 10 MG/DL (ref 8.6–10.5)
CHLORIDE SERPL-SCNC: 106 MMOL/L (ref 98–107)
CHOLEST SERPL-MCNC: 183 MG/DL (ref 0–200)
CO2 SERPL-SCNC: 28.7 MMOL/L (ref 22–29)
CREAT SERPL-MCNC: 1.31 MG/DL (ref 0.76–1.27)
DEPRECATED RDW RBC AUTO: 40.5 FL (ref 37–54)
EGFRCR SERPLBLD CKD-EPI 2021: 64.3 ML/MIN/1.73
EOSINOPHIL # BLD AUTO: 0.11 10*3/MM3 (ref 0–0.4)
EOSINOPHIL NFR BLD AUTO: 2.2 % (ref 0.3–6.2)
ERYTHROCYTE [DISTWIDTH] IN BLOOD BY AUTOMATED COUNT: 12.3 % (ref 12.3–15.4)
GLOBULIN UR ELPH-MCNC: 2.6 GM/DL
GLUCOSE SERPL-MCNC: 102 MG/DL (ref 65–99)
HCT VFR BLD AUTO: 51.1 % (ref 37.5–51)
HDLC SERPL-MCNC: 39 MG/DL (ref 40–60)
HGB BLD-MCNC: 17.5 G/DL (ref 13–17.7)
IMM GRANULOCYTES # BLD AUTO: 0.01 10*3/MM3 (ref 0–0.05)
IMM GRANULOCYTES NFR BLD AUTO: 0.2 % (ref 0–0.5)
LDLC SERPL CALC-MCNC: 127 MG/DL (ref 0–100)
LDLC/HDLC SERPL: 3.22 {RATIO}
LYMPHOCYTES # BLD AUTO: 1.24 10*3/MM3 (ref 0.7–3.1)
LYMPHOCYTES NFR BLD AUTO: 24.8 % (ref 19.6–45.3)
MCH RBC QN AUTO: 31.1 PG (ref 26.6–33)
MCHC RBC AUTO-ENTMCNC: 34.2 G/DL (ref 31.5–35.7)
MCV RBC AUTO: 90.9 FL (ref 79–97)
MONOCYTES # BLD AUTO: 0.5 10*3/MM3 (ref 0.1–0.9)
MONOCYTES NFR BLD AUTO: 10 % (ref 5–12)
NEUTROPHILS NFR BLD AUTO: 3.08 10*3/MM3 (ref 1.7–7)
NEUTROPHILS NFR BLD AUTO: 61.6 % (ref 42.7–76)
NRBC BLD AUTO-RTO: 0 /100 WBC (ref 0–0.2)
PLATELET # BLD AUTO: 283 10*3/MM3 (ref 140–450)
PMV BLD AUTO: 9 FL (ref 6–12)
POTASSIUM SERPL-SCNC: 4.7 MMOL/L (ref 3.5–5.2)
PROT SERPL-MCNC: 6.8 G/DL (ref 6–8.5)
PSA SERPL-MCNC: 4.88 NG/ML (ref 0–4)
RBC # BLD AUTO: 5.62 10*6/MM3 (ref 4.14–5.8)
SODIUM SERPL-SCNC: 144 MMOL/L (ref 136–145)
TRIGL SERPL-MCNC: 92 MG/DL (ref 0–150)
VLDLC SERPL-MCNC: 17 MG/DL (ref 5–40)
WBC NRBC COR # BLD AUTO: 5 10*3/MM3 (ref 3.4–10.8)

## 2024-10-31 PROCEDURE — G0103 PSA SCREENING: HCPCS | Performed by: FAMILY MEDICINE

## 2024-10-31 PROCEDURE — 99396 PREV VISIT EST AGE 40-64: CPT | Performed by: FAMILY MEDICINE

## 2024-10-31 PROCEDURE — 80061 LIPID PANEL: CPT | Performed by: FAMILY MEDICINE

## 2024-10-31 PROCEDURE — 84403 ASSAY OF TOTAL TESTOSTERONE: CPT | Performed by: FAMILY MEDICINE

## 2024-10-31 PROCEDURE — 84402 ASSAY OF FREE TESTOSTERONE: CPT | Performed by: FAMILY MEDICINE

## 2024-10-31 PROCEDURE — 80053 COMPREHEN METABOLIC PANEL: CPT | Performed by: FAMILY MEDICINE

## 2024-10-31 PROCEDURE — 36415 COLL VENOUS BLD VENIPUNCTURE: CPT | Performed by: FAMILY MEDICINE

## 2024-10-31 PROCEDURE — 85025 COMPLETE CBC W/AUTO DIFF WBC: CPT | Performed by: FAMILY MEDICINE

## 2024-10-31 NOTE — PROGRESS NOTES
"Subjective   Ari Ngo is a 54 y.o. male.     History of Present Illness  Ari Ngo is in for his annual well visit.  He does take medication for high cholesterol and for attention deficit issues.  He is still taking classes and the medication is helping him function and focus well.  He has no immediate complaints today.  There is no history of chest pain or dyspnea. There is no history of issue with bowel or bladder dysfunction. There is no history of dizziness or lightheadedness. There is no history of issue with sleep or mood. There is no history of issue with present medication.              /84 (BP Location: Left arm, Patient Position: Sitting, Cuff Size: Large Adult)   Pulse 65   Temp 98.4 °F (36.9 °C) (Temporal)   Ht 180.3 cm (70.98\")   Wt 108 kg (237 lb)   SpO2 98%   BMI 33.07 kg/m²       Chief Complaint   Patient presents with    Annual Exam           Current Outpatient Medications:     amphetamine-dextroamphetamine (Adderall) 10 MG tablet, Take 1 tablet by mouth 2 (Two) Times a Day., Disp: 60 tablet, Rfl: 0    atorvastatin (LIPITOR) 20 MG tablet, TAKE 1 TABLET BY MOUTH EVERYDAY AT BEDTIME, Disp: 90 tablet, Rfl: 1    baclofen (LIORESAL) 10 MG tablet, Take 1 tablet by mouth 3 (Three) Times a Day As Needed for Muscle Spasms., Disp: 60 tablet, Rfl: 2    ibuprofen (ADVIL,MOTRIN) 800 MG tablet, TAKE 1 TABLET BY MOUTH EVERY 6 HOURS AS NEEDED FOR MILD PAIN, Disp: 90 tablet, Rfl: 1    predniSONE (DELTASONE) 10 MG tablet, Take 4 tabs po daily x 4 d, then 3 tabs po daily x 4 d , then 2 tabs po daily x 4 d, then 1 tab po daily x 4 d, Disp: 40 tablet, Rfl: 0            The following portions of the patient's history were reviewed and updated as appropriate: allergies, current medications, past family history, past medical history, past social history, past surgical history, and problem list.    Review of Systems   Constitutional:  Negative for activity change, fatigue and fever.   HENT:  Negative " for congestion, sinus pressure, sinus pain, sore throat and trouble swallowing.    Eyes:  Negative for visual disturbance.   Respiratory:  Negative for chest tightness, shortness of breath and wheezing.    Cardiovascular:  Negative for chest pain.   Gastrointestinal:  Negative for abdominal distention, abdominal pain, constipation, diarrhea, nausea and vomiting.   Genitourinary:  Negative for difficulty urinating and dysuria.   Musculoskeletal:  Negative for back pain and neck pain.   Neurological:  Negative for weakness, numbness and headaches.   Psychiatric/Behavioral:  Positive for decreased concentration. Negative for agitation, hallucinations and suicidal ideas.        Objective   Physical Exam  Vitals and nursing note reviewed.   Constitutional:       Appearance: Normal appearance.   HENT:      Right Ear: Tympanic membrane and ear canal normal.      Left Ear: Tympanic membrane and ear canal normal.      Nose: No congestion.      Mouth/Throat:      Pharynx: Oropharynx is clear.   Eyes:      Conjunctiva/sclera: Conjunctivae normal.      Pupils: Pupils are equal, round, and reactive to light.   Cardiovascular:      Rate and Rhythm: Normal rate and regular rhythm.      Heart sounds: Normal heart sounds. No murmur heard.  Pulmonary:      Effort: Pulmonary effort is normal.      Breath sounds: No wheezing or rales.   Abdominal:      General: Bowel sounds are normal.      Palpations: Abdomen is soft.      Tenderness: There is no abdominal tenderness. There is no guarding or rebound.      Hernia: No hernia is present.   Musculoskeletal:      Cervical back: Neck supple.      Right lower leg: No edema.      Left lower leg: No edema.   Lymphadenopathy:      Cervical: No cervical adenopathy.   Neurological:      Mental Status: He is alert and oriented to person, place, and time. Mental status is at baseline.   Psychiatric:         Mood and Affect: Mood normal.           Assessment & Plan   Problems Addressed this Visit     None  Visit Diagnoses       Encounter for well adult exam without abnormal findings    -  Primary    Relevant Orders    Comprehensive Metabolic Panel    Lipid Panel    Testosterone (Free & Total), LC / MS    CBC w AUTO Differential    Encounter for special screening examination for neoplasm of prostate        Relevant Orders    PSA Screen          Diagnoses         Codes Comments    Encounter for well adult exam without abnormal findings    -  Primary ICD-10-CM: Z00.00  ICD-9-CM: V70.0     Encounter for special screening examination for neoplasm of prostate     ICD-10-CM: Z12.5  ICD-9-CM: V76.44           I will update some labs and adjust treatment plan as indicated  For now no need to change any medication  We did discuss some exercise adjustments  I do plan to see him again in 6 months for follow-up on his use of Adderall

## 2024-11-06 LAB
TESTOST FREE SERPL-MCNC: 7.8 PG/ML (ref 7.2–24)
TESTOST SERPL-MCNC: 833.1 NG/DL (ref 264–916)

## 2024-11-07 ENCOUNTER — PATIENT MESSAGE (OUTPATIENT)
Dept: FAMILY MEDICINE CLINIC | Facility: CLINIC | Age: 54
End: 2024-11-07

## 2024-11-08 DIAGNOSIS — F90.9 ATTENTION DEFICIT HYPERACTIVITY DISORDER (ADHD), UNSPECIFIED ADHD TYPE: ICD-10-CM

## 2024-11-08 RX ORDER — DEXTROAMPHETAMINE SACCHARATE, AMPHETAMINE ASPARTATE, DEXTROAMPHETAMINE SULFATE AND AMPHETAMINE SULFATE 2.5; 2.5; 2.5; 2.5 MG/1; MG/1; MG/1; MG/1
10 TABLET ORAL 2 TIMES DAILY
Qty: 60 TABLET | Refills: 0 | Status: SHIPPED | OUTPATIENT
Start: 2024-11-08

## 2024-12-08 ENCOUNTER — PATIENT MESSAGE (OUTPATIENT)
Dept: FAMILY MEDICINE CLINIC | Facility: CLINIC | Age: 54
End: 2024-12-08

## 2024-12-08 DIAGNOSIS — F90.9 ATTENTION DEFICIT HYPERACTIVITY DISORDER (ADHD), UNSPECIFIED ADHD TYPE: ICD-10-CM

## 2024-12-08 RX ORDER — DEXTROAMPHETAMINE SACCHARATE, AMPHETAMINE ASPARTATE, DEXTROAMPHETAMINE SULFATE AND AMPHETAMINE SULFATE 2.5; 2.5; 2.5; 2.5 MG/1; MG/1; MG/1; MG/1
10 TABLET ORAL 2 TIMES DAILY
Qty: 60 TABLET | Refills: 0 | Status: SHIPPED | OUTPATIENT
Start: 2024-12-08

## 2024-12-19 ENCOUNTER — PATIENT MESSAGE (OUTPATIENT)
Dept: FAMILY MEDICINE CLINIC | Facility: CLINIC | Age: 54
End: 2024-12-19

## 2024-12-19 DIAGNOSIS — F90.9 ATTENTION DEFICIT HYPERACTIVITY DISORDER (ADHD), UNSPECIFIED ADHD TYPE: ICD-10-CM

## 2024-12-19 RX ORDER — DEXTROAMPHETAMINE SACCHARATE, AMPHETAMINE ASPARTATE, DEXTROAMPHETAMINE SULFATE AND AMPHETAMINE SULFATE 1.25; 1.25; 1.25; 1.25 MG/1; MG/1; MG/1; MG/1
5 TABLET ORAL 2 TIMES DAILY
COMMUNITY
Start: 2024-12-19

## 2025-01-07 DIAGNOSIS — F90.9 ATTENTION DEFICIT HYPERACTIVITY DISORDER (ADHD), UNSPECIFIED ADHD TYPE: Primary | ICD-10-CM

## 2025-01-07 RX ORDER — DEXTROAMPHETAMINE SACCHARATE, AMPHETAMINE ASPARTATE, DEXTROAMPHETAMINE SULFATE AND AMPHETAMINE SULFATE 1.25; 1.25; 1.25; 1.25 MG/1; MG/1; MG/1; MG/1
5 TABLET ORAL 2 TIMES DAILY
Qty: 60 TABLET | Refills: 0 | Status: SHIPPED | OUTPATIENT
Start: 2025-01-07

## 2025-02-05 DIAGNOSIS — F90.9 ATTENTION DEFICIT HYPERACTIVITY DISORDER (ADHD), UNSPECIFIED ADHD TYPE: ICD-10-CM

## 2025-02-06 RX ORDER — DEXTROAMPHETAMINE SACCHARATE, AMPHETAMINE ASPARTATE, DEXTROAMPHETAMINE SULFATE AND AMPHETAMINE SULFATE 1.25; 1.25; 1.25; 1.25 MG/1; MG/1; MG/1; MG/1
5 TABLET ORAL 2 TIMES DAILY
Qty: 60 TABLET | Refills: 0 | Status: SHIPPED | OUTPATIENT
Start: 2025-02-06

## 2025-02-11 ENCOUNTER — PATIENT MESSAGE (OUTPATIENT)
Dept: FAMILY MEDICINE CLINIC | Facility: CLINIC | Age: 55
End: 2025-02-11
Payer: OTHER GOVERNMENT

## 2025-02-11 RX ORDER — ATORVASTATIN CALCIUM 40 MG/1
40 TABLET, FILM COATED ORAL
COMMUNITY
Start: 2025-02-11 | End: 2025-02-15 | Stop reason: SDUPTHER

## 2025-02-13 ENCOUNTER — READMISSION MANAGEMENT (OUTPATIENT)
Dept: CALL CENTER | Facility: HOSPITAL | Age: 55
End: 2025-02-13
Payer: OTHER GOVERNMENT

## 2025-02-13 ENCOUNTER — PATIENT MESSAGE (OUTPATIENT)
Dept: FAMILY MEDICINE CLINIC | Facility: CLINIC | Age: 55
End: 2025-02-13
Payer: OTHER GOVERNMENT

## 2025-02-13 NOTE — OUTREACH NOTE
Prep Survey      Flowsheet Row Responses   [facility] facility patient discharged from? Non-[facility]   Is LACE score < 7 ? Non-BH Discharge   Eligibility [facility]   Date of Admission 02/11/25   Date of Discharge 02/13/25   Discharge diagnosis Acute CVA (cerebrovascular accident)   Does the patient have one of the following disease processes/diagnoses(primary or secondary)? Stroke   Prep survey completed? Yes            Michelle FORRESTER - Registered Nurse

## 2025-02-14 ENCOUNTER — TRANSITIONAL CARE MANAGEMENT TELEPHONE ENCOUNTER (OUTPATIENT)
Dept: CALL CENTER | Facility: HOSPITAL | Age: 55
End: 2025-02-14
Payer: OTHER GOVERNMENT

## 2025-02-14 NOTE — OUTREACH NOTE
Call Center TCM Note      Flowsheet Row Responses   [facility] patient discharged from? Non-   Does the patient have one of the following disease processes/diagnoses(primary or secondary)? Other   TCM attempt successful? Yes   Call start time 1327   Call end time 1333   List who call center can speak with pt   Meds reviewed with patient/caregiver? Yes   Is the patient having any side effects they believe may be caused by any medication additions or changes? No   Does the patient have all medications ordered at discharge? Yes   Is the patient taking all medications as directed (includes completed medication regime)? Yes   Comments Hospital f/u appointment scheduled for 2- @ 8 am.   Does the patient have an appointment with their PCP within 7-14 days of discharge? Yes   Has home health visited the patient within 72 hours of discharge? N/A   Psychosocial issues? No   Did the patient receive a copy of their discharge instructions? Yes   Nursing interventions Reviewed instructions with patient   What is the patient's perception of their health status since discharge? Improving   Is the patient/caregiver able to teach back signs and symptoms related to disease process for when to call PCP? Yes   Is the patient/caregiver able to teach back signs and symptoms related to disease process for when to call 911? Yes   Is the patient/caregiver able to teach back the hierarchy of who to call/visit for symptoms/problems? PCP, Specialist, Home health nurse, Urgent Care, ED, 911 Yes   TCM call completed? Yes   Wrap up additional comments Pt reports feeling well. Pt has all medications. Pt waiting for appointment with neurology.   Call end time 1333   Would this patient benefit from a Referral to Amb Social Work? No   Is the patient interested in additional calls from an ambulatory ? No            Micehlle Campos RN    2/14/2025, 13:36 EST

## 2025-02-14 NOTE — OUTREACH NOTE
Call Center TCM Note      Flowsheet Row Responses   [facility] patient discharged from? Non-   Does the patient have one of the following disease processes/diagnoses(primary or secondary)? Other   TCM attempt successful? No   Unsuccessful attempts Attempt 1            Michelle Campos, DOLORES    2/14/2025, 12:50 EST

## 2025-02-16 RX ORDER — ATORVASTATIN CALCIUM 40 MG/1
40 TABLET, FILM COATED ORAL
Qty: 90 TABLET | Refills: 1 | Status: SHIPPED | OUTPATIENT
Start: 2025-02-16

## 2025-02-27 ENCOUNTER — LAB (OUTPATIENT)
Dept: FAMILY MEDICINE CLINIC | Facility: CLINIC | Age: 55
End: 2025-02-27
Payer: OTHER GOVERNMENT

## 2025-02-27 ENCOUNTER — OFFICE VISIT (OUTPATIENT)
Dept: FAMILY MEDICINE CLINIC | Facility: CLINIC | Age: 55
End: 2025-02-27
Payer: OTHER GOVERNMENT

## 2025-02-27 VITALS
SYSTOLIC BLOOD PRESSURE: 126 MMHG | HEIGHT: 71 IN | DIASTOLIC BLOOD PRESSURE: 85 MMHG | BODY MASS INDEX: 31.64 KG/M2 | HEART RATE: 65 BPM | OXYGEN SATURATION: 98 % | WEIGHT: 226 LBS | TEMPERATURE: 97.5 F

## 2025-02-27 DIAGNOSIS — I63.9 ACUTE CVA (CEREBROVASCULAR ACCIDENT): Primary | ICD-10-CM

## 2025-02-27 DIAGNOSIS — R97.20 ELEVATED PSA: ICD-10-CM

## 2025-02-27 LAB
ALBUMIN SERPL-MCNC: 4.2 G/DL (ref 3.5–5.2)
ALBUMIN/GLOB SERPL: 1.4 G/DL
ALP SERPL-CCNC: 89 U/L (ref 39–117)
ALT SERPL W P-5'-P-CCNC: 30 U/L (ref 1–41)
ANION GAP SERPL CALCULATED.3IONS-SCNC: 10.4 MMOL/L (ref 5–15)
AST SERPL-CCNC: 18 U/L (ref 1–40)
BASOPHILS # BLD AUTO: 0.04 10*3/MM3 (ref 0–0.2)
BASOPHILS NFR BLD AUTO: 0.4 % (ref 0–1.5)
BILIRUB SERPL-MCNC: 1 MG/DL (ref 0–1.2)
BUN SERPL-MCNC: 23 MG/DL (ref 6–20)
BUN/CREAT SERPL: 17.4 (ref 7–25)
CALCIUM SPEC-SCNC: 10.1 MG/DL (ref 8.6–10.5)
CHLORIDE SERPL-SCNC: 103 MMOL/L (ref 98–107)
CHOLEST SERPL-MCNC: 142 MG/DL (ref 0–200)
CO2 SERPL-SCNC: 26.6 MMOL/L (ref 22–29)
CREAT SERPL-MCNC: 1.32 MG/DL (ref 0.76–1.27)
DEPRECATED RDW RBC AUTO: 41.8 FL (ref 37–54)
EGFRCR SERPLBLD CKD-EPI 2021: 63.7 ML/MIN/1.73
EOSINOPHIL # BLD AUTO: 0.05 10*3/MM3 (ref 0–0.4)
EOSINOPHIL NFR BLD AUTO: 0.5 % (ref 0.3–6.2)
ERYTHROCYTE [DISTWIDTH] IN BLOOD BY AUTOMATED COUNT: 13 % (ref 12.3–15.4)
GLOBULIN UR ELPH-MCNC: 3 GM/DL
GLUCOSE SERPL-MCNC: 102 MG/DL (ref 65–99)
HCT VFR BLD AUTO: 50.9 % (ref 37.5–51)
HDLC SERPL-MCNC: 37 MG/DL (ref 40–60)
HGB BLD-MCNC: 18.1 G/DL (ref 13–17.7)
IMM GRANULOCYTES # BLD AUTO: 0.03 10*3/MM3 (ref 0–0.05)
IMM GRANULOCYTES NFR BLD AUTO: 0.3 % (ref 0–0.5)
LDLC SERPL CALC-MCNC: 93 MG/DL (ref 0–100)
LDLC/HDLC SERPL: 2.54 {RATIO}
LYMPHOCYTES # BLD AUTO: 1.18 10*3/MM3 (ref 0.7–3.1)
LYMPHOCYTES NFR BLD AUTO: 12 % (ref 19.6–45.3)
MCH RBC QN AUTO: 31.6 PG (ref 26.6–33)
MCHC RBC AUTO-ENTMCNC: 35.6 G/DL (ref 31.5–35.7)
MCV RBC AUTO: 88.8 FL (ref 79–97)
MONOCYTES # BLD AUTO: 0.77 10*3/MM3 (ref 0.1–0.9)
MONOCYTES NFR BLD AUTO: 7.8 % (ref 5–12)
NEUTROPHILS NFR BLD AUTO: 7.8 10*3/MM3 (ref 1.7–7)
NEUTROPHILS NFR BLD AUTO: 79 % (ref 42.7–76)
NRBC BLD AUTO-RTO: 0 /100 WBC (ref 0–0.2)
PLATELET # BLD AUTO: 341 10*3/MM3 (ref 140–450)
PMV BLD AUTO: 9.1 FL (ref 6–12)
POTASSIUM SERPL-SCNC: 5.1 MMOL/L (ref 3.5–5.2)
PROT SERPL-MCNC: 7.2 G/DL (ref 6–8.5)
PSA SERPL-MCNC: 4.27 NG/ML (ref 0–4)
RBC # BLD AUTO: 5.73 10*6/MM3 (ref 4.14–5.8)
SODIUM SERPL-SCNC: 140 MMOL/L (ref 136–145)
TRIGL SERPL-MCNC: 55 MG/DL (ref 0–150)
VLDLC SERPL-MCNC: 12 MG/DL (ref 5–40)
WBC NRBC COR # BLD AUTO: 9.87 10*3/MM3 (ref 3.4–10.8)

## 2025-02-27 PROCEDURE — 85025 COMPLETE CBC W/AUTO DIFF WBC: CPT | Performed by: FAMILY MEDICINE

## 2025-02-27 PROCEDURE — 84153 ASSAY OF PSA TOTAL: CPT | Performed by: FAMILY MEDICINE

## 2025-02-27 PROCEDURE — 80061 LIPID PANEL: CPT | Performed by: FAMILY MEDICINE

## 2025-02-27 PROCEDURE — 80053 COMPREHEN METABOLIC PANEL: CPT | Performed by: FAMILY MEDICINE

## 2025-02-27 PROCEDURE — 36415 COLL VENOUS BLD VENIPUNCTURE: CPT | Performed by: FAMILY MEDICINE

## 2025-02-27 RX ORDER — CLOPIDOGREL BISULFATE 75 MG/1
75 TABLET ORAL DAILY
COMMUNITY
Start: 2025-02-13 | End: 2025-03-05

## 2025-02-27 RX ORDER — ASPIRIN 81 MG/1
81 TABLET, CHEWABLE ORAL DAILY
COMMUNITY

## 2025-02-27 NOTE — PROGRESS NOTES
"Subjective   Ari Ngo is a 54 y.o. male.     History of Present Illness  Ari Ngo is in for follow up on his recent hospital stay in [place] for an acute CVA.  He woke up with some right leg weakness right arm weakness and some difficult speech.  He was evaluated in [place] and found to have PFO.  He was started on Plavix and is set to follow-up with cardiology to have a PFO closure.  His symptoms have seemingly fully resolved and he is back to full activities as he can tolerate.  He has tried to be much better with diet and has lost some weight.  He was temporarily off of his Adderall but is back on it.  He was planning to have a PA E done to help with his prostate but that will be deferred for now.. There is no history of chest pain or dyspnea. There is no history of issue with bowel or bladder dysfunction. There is no history of dizziness or lightheadedness. There is no history of issue with sleep or mood. There is no history of issue with present medication.              /85 (BP Location: Left arm, Patient Position: Sitting, Cuff Size: Large Adult)   Pulse 65   Temp 97.5 °F (36.4 °C) (Temporal)   Ht 180.3 cm (70.98\")   Wt 103 kg (226 lb)   SpO2 98%   BMI 31.53 kg/m²       Chief Complaint   Patient presents with    Hospital Follow Up Visit     [facility] 2/[facility]/25           Current Outpatient Medications:     amphetamine-dextroamphetamine (Adderall) 5 MG tablet, Take 1 tablet by mouth 2 (Two) Times a Day., Disp: 60 tablet, Rfl: 0    aspirin 81 MG chewable tablet, Chew 1 tablet Daily., Disp: , Rfl:     atorvastatin (LIPITOR) 40 MG tablet, Take 1 tablet by mouth every night at bedtime., Disp: 90 tablet, Rfl: 1    clopidogrel (PLAVIX) 75 MG tablet, Take 1 tablet by mouth Daily., Disp: , Rfl:     ibuprofen (ADVIL,MOTRIN) 800 MG tablet, TAKE 1 TABLET BY MOUTH EVERY 6 HOURS AS NEEDED FOR MILD PAIN, Disp: 90 tablet, Rfl: 1            The following portions of the patient's history were reviewed " and updated as appropriate: allergies, current medications, past family history, past medical history, past social history, past surgical history, and problem list.    Review of Systems   Constitutional:  Negative for activity change, fatigue and fever.   HENT:  Negative for congestion, sinus pressure, sinus pain, sore throat and trouble swallowing.    Eyes:  Negative for visual disturbance.   Respiratory:  Negative for chest tightness, shortness of breath and wheezing.    Cardiovascular:  Negative for chest pain.   Gastrointestinal:  Negative for abdominal distention, abdominal pain, constipation, diarrhea, nausea and vomiting.   Genitourinary:  Negative for difficulty urinating and dysuria.   Musculoskeletal:  Negative for arthralgias, back pain, myalgias and neck pain.   Neurological:  Negative for dizziness and headaches.   Psychiatric/Behavioral:  Negative for agitation, hallucinations and suicidal ideas.        Objective   Physical Exam  Vitals and nursing note reviewed.   Constitutional:       Appearance: Normal appearance.   HENT:      Right Ear: Tympanic membrane and ear canal normal.      Left Ear: Tympanic membrane and ear canal normal.   Cardiovascular:      Rate and Rhythm: Normal rate and regular rhythm.      Heart sounds: Normal heart sounds. No murmur heard.  Pulmonary:      Effort: Pulmonary effort is normal.      Breath sounds: No wheezing or rales.   Abdominal:      General: Bowel sounds are normal.      Palpations: Abdomen is soft.      Tenderness: There is no abdominal tenderness. There is no guarding or rebound.      Hernia: No hernia is present.   Musculoskeletal:      Cervical back: Neck supple. No rigidity.      Right lower leg: No edema.      Left lower leg: No edema.   Lymphadenopathy:      Cervical: No cervical adenopathy.   Skin:     Findings: Lesion (Minor skin lesion on his back, appears dry) present.   Neurological:      Mental Status: He is alert and oriented to person, place, and  time. Mental status is at baseline.      Comments: Both right upper and right lower extremity seem back to their normal strength  Speech was clear  Balance normal   Psychiatric:         Mood and Affect: Mood normal.           Assessment & Plan   Problems Addressed this Visit    None  Visit Diagnoses       Acute CVA (cerebrovascular accident)    -  Primary    Relevant Orders    Comprehensive metabolic panel    Lipid panel    CBC w AUTO Differential    Elevated PSA        Relevant Orders    PSA DIAGNOSTIC ONLY          Diagnoses         Codes Comments    Acute CVA (cerebrovascular accident)    -  Primary ICD-10-CM: I63.9  ICD-9-CM: 434.91     Elevated PSA     ICD-10-CM: R97.20  ICD-9-CM: 790.93           I will update some labs and follow-up with him in May as planned  I have asked him to let me know if he needs a refill on the Plavix but I would like to see him stay on that until he has his visits with cardiology and gets this PFO addressed  I have asked him to call with any new concerns  I am fine with him being back on his Adderall as I do not think it had any role in this at all  I asked him to call with any new concerns

## 2025-03-11 DIAGNOSIS — F90.9 ATTENTION DEFICIT HYPERACTIVITY DISORDER (ADHD), UNSPECIFIED ADHD TYPE: ICD-10-CM

## 2025-03-11 RX ORDER — DEXTROAMPHETAMINE SACCHARATE, AMPHETAMINE ASPARTATE, DEXTROAMPHETAMINE SULFATE AND AMPHETAMINE SULFATE 1.25; 1.25; 1.25; 1.25 MG/1; MG/1; MG/1; MG/1
5 TABLET ORAL 2 TIMES DAILY
Qty: 60 TABLET | Refills: 0 | Status: SHIPPED | OUTPATIENT
Start: 2025-03-11

## 2025-04-02 ENCOUNTER — PATIENT MESSAGE (OUTPATIENT)
Dept: FAMILY MEDICINE CLINIC | Facility: CLINIC | Age: 55
End: 2025-04-02
Payer: OTHER GOVERNMENT

## 2025-04-03 RX ORDER — BACLOFEN 10 MG/1
10 TABLET ORAL 3 TIMES DAILY PRN
Qty: 60 TABLET | Refills: 2 | Status: SHIPPED | OUTPATIENT
Start: 2025-04-03

## 2025-04-09 DIAGNOSIS — I63.9 ACUTE CVA (CEREBROVASCULAR ACCIDENT): Primary | ICD-10-CM

## 2025-04-11 ENCOUNTER — TELEPHONE (OUTPATIENT)
Dept: FAMILY MEDICINE CLINIC | Facility: CLINIC | Age: 55
End: 2025-04-11

## 2025-04-11 DIAGNOSIS — R97.20 ELEVATED PSA: Primary | ICD-10-CM

## 2025-04-11 NOTE — TELEPHONE ENCOUNTER
He is going to be seeing Dr. Anderson on 6/25/25 at [facility]. They are needing a referral and insurance auth.

## 2025-04-15 DIAGNOSIS — F90.9 ATTENTION DEFICIT HYPERACTIVITY DISORDER (ADHD), UNSPECIFIED ADHD TYPE: ICD-10-CM

## 2025-04-15 RX ORDER — DEXTROAMPHETAMINE SACCHARATE, AMPHETAMINE ASPARTATE, DEXTROAMPHETAMINE SULFATE AND AMPHETAMINE SULFATE 1.25; 1.25; 1.25; 1.25 MG/1; MG/1; MG/1; MG/1
5 TABLET ORAL 2 TIMES DAILY
Qty: 60 TABLET | Refills: 0 | Status: SHIPPED | OUTPATIENT
Start: 2025-04-15

## 2025-04-19 DIAGNOSIS — L98.9 SKIN LESION OF LEFT ARM: Primary | ICD-10-CM

## 2025-04-19 DIAGNOSIS — L98.9 SKIN LESION OF RIGHT ARM: ICD-10-CM

## 2025-05-09 ENCOUNTER — OFFICE VISIT (OUTPATIENT)
Dept: FAMILY MEDICINE CLINIC | Facility: CLINIC | Age: 55
End: 2025-05-09
Payer: OTHER GOVERNMENT

## 2025-05-09 VITALS
HEIGHT: 71 IN | TEMPERATURE: 97.9 F | DIASTOLIC BLOOD PRESSURE: 86 MMHG | WEIGHT: 222.4 LBS | OXYGEN SATURATION: 99 % | HEART RATE: 76 BPM | BODY MASS INDEX: 31.14 KG/M2 | SYSTOLIC BLOOD PRESSURE: 119 MMHG

## 2025-05-09 DIAGNOSIS — F90.9 ATTENTION DEFICIT HYPERACTIVITY DISORDER (ADHD), UNSPECIFIED ADHD TYPE: ICD-10-CM

## 2025-05-09 PROCEDURE — 99213 OFFICE O/P EST LOW 20 MIN: CPT | Performed by: NURSE PRACTITIONER

## 2025-05-09 NOTE — PROGRESS NOTES
"Chief Complaint  med check  (f/u med check for follow-up on his use of Adderall/) and Rash    Subjective        Ari Ngo presents to [facility]  History of Present Illness  Ari is a 54 year old male presenting today for a follow up.  He had his PFO closure on 4/23/2025.  He has an echo scheduled on 7/30/2025.  He is doing well on his Adderall.  He takes 5 mg twice daily.  He does not always take his second dose of 5 mg unless he feels like he needs it.  He reports a lot of improvement in his anxiety when he restarted this medication.            The following portions of the patient's history were reviewed and updated as appropriate: allergies, current medications, past family history, past medical history, past social history, past surgical history and problem list.    No Known Allergies    Patient Active Problem List   Diagnosis    Mixed hyperlipidemia    Inflammation of sacroiliac joint    Class 1 obesity due to excess calories with serious comorbidity and body mass index (BMI) of 34.0 to 34.9 in adult       Current Outpatient Medications   Medication Instructions    amphetamine-dextroamphetamine (Adderall) 5 MG tablet 5 mg, Oral, 2 Times Daily    aspirin 81 mg, Daily    atorvastatin (LIPITOR) 40 mg, Oral, Every Night at Bedtime    baclofen (LIORESAL) 10 mg, Oral, 3 Times Daily PRN    clopidogrel (PLAVIX) 75 mg, Daily    ibuprofen (ADVIL,MOTRIN) 800 mg, Oral, Every 6 Hours PRN          Objective   Vital Signs:  /86 (BP Location: Left arm, Patient Position: Sitting, Cuff Size: Large Adult)   Pulse 76   Temp 97.9 °F (36.6 °C) (Temporal)   Ht 180.3 cm (70.98\")   Wt 101 kg (222 lb 6.4 oz)   SpO2 99%   BMI 31.03 kg/m²   Estimated body mass index is 31.03 kg/m² as calculated from the following:    Height as of this encounter: 180.3 cm (70.98\").    Weight as of this encounter: 101 kg (222 lb 6.4 oz).             Review of Systems   Constitutional:  Negative for " chills, diaphoresis, fatigue and fever.   HENT:  Negative for congestion and sore throat.    Respiratory:  Negative for cough.    Cardiovascular:  Negative for chest pain.   Gastrointestinal:  Negative for abdominal pain, nausea and vomiting.   Genitourinary:  Negative for dysuria.   Musculoskeletal:  Negative for myalgias and neck pain.   Skin:  Positive for rash.   Neurological:  Negative for weakness, numbness and headaches.   Psychiatric/Behavioral:  Negative for sleep disturbance. The patient is not nervous/anxious.         Physical Exam  Constitutional:       Appearance: Normal appearance.   Cardiovascular:      Rate and Rhythm: Normal rate and regular rhythm.      Pulses: Normal pulses.      Heart sounds: Normal heart sounds.   Pulmonary:      Effort: Pulmonary effort is normal.      Breath sounds: Normal breath sounds.   Neurological:      Mental Status: He is alert and oriented to person, place, and time.   Psychiatric:         Mood and Affect: Mood normal.         Behavior: Behavior normal.         Thought Content: Thought content normal.         Judgment: Judgment normal.        Result Review :                   Assessment and Plan   Diagnoses and all orders for this visit:    1. Attention deficit hyperactivity disorder (ADHD), unspecified ADHD type  Comments:  stable.  cont adderall 5mg BID  f/u 6 mon             Follow Up   Return in about 6 months (around 11/9/2025) for Annual physical, Recheck.  Patient was given instructions and counseling regarding his condition or for health maintenance advice. Please see specific information pulled into the AVS if appropriate.

## 2025-05-26 DIAGNOSIS — F90.9 ATTENTION DEFICIT HYPERACTIVITY DISORDER (ADHD), UNSPECIFIED ADHD TYPE: ICD-10-CM

## 2025-05-26 RX ORDER — BACLOFEN 10 MG/1
10 TABLET ORAL 3 TIMES DAILY PRN
Qty: 60 TABLET | Refills: 2 | Status: SHIPPED | OUTPATIENT
Start: 2025-05-26

## 2025-05-26 RX ORDER — DEXTROAMPHETAMINE SACCHARATE, AMPHETAMINE ASPARTATE, DEXTROAMPHETAMINE SULFATE AND AMPHETAMINE SULFATE 1.25; 1.25; 1.25; 1.25 MG/1; MG/1; MG/1; MG/1
5 TABLET ORAL 2 TIMES DAILY
Qty: 60 TABLET | Refills: 0 | Status: SHIPPED | OUTPATIENT
Start: 2025-05-26

## 2025-07-19 DIAGNOSIS — F90.9 ATTENTION DEFICIT HYPERACTIVITY DISORDER (ADHD), UNSPECIFIED ADHD TYPE: ICD-10-CM

## 2025-07-20 RX ORDER — DEXTROAMPHETAMINE SACCHARATE, AMPHETAMINE ASPARTATE, DEXTROAMPHETAMINE SULFATE AND AMPHETAMINE SULFATE 1.25; 1.25; 1.25; 1.25 MG/1; MG/1; MG/1; MG/1
5 TABLET ORAL 2 TIMES DAILY
Qty: 60 TABLET | Refills: 0 | Status: SHIPPED | OUTPATIENT
Start: 2025-07-20

## 2025-08-15 RX ORDER — ATORVASTATIN CALCIUM 40 MG/1
40 TABLET, FILM COATED ORAL
Qty: 90 TABLET | Refills: 1 | Status: SHIPPED | OUTPATIENT
Start: 2025-08-15